# Patient Record
Sex: FEMALE | Race: ASIAN | Employment: UNEMPLOYED | URBAN - METROPOLITAN AREA
[De-identification: names, ages, dates, MRNs, and addresses within clinical notes are randomized per-mention and may not be internally consistent; named-entity substitution may affect disease eponyms.]

---

## 2017-05-17 PROCEDURE — 87624 HPV HI-RISK TYP POOLED RSLT: CPT | Performed by: OBSTETRICS & GYNECOLOGY

## 2017-05-17 PROCEDURE — 88175 CYTOPATH C/V AUTO FLUID REDO: CPT | Performed by: OBSTETRICS & GYNECOLOGY

## 2017-05-18 RX ORDER — MULTIVITAMIN
1 TABLET ORAL DAILY
COMMUNITY

## 2017-05-24 ENCOUNTER — HOSPITAL ENCOUNTER (OUTPATIENT)
Facility: HOSPITAL | Age: 62
Setting detail: HOSPITAL OUTPATIENT SURGERY
Discharge: HOME | End: 2017-05-24
Attending: OBSTETRICS & GYNECOLOGY | Admitting: OBSTETRICS & GYNECOLOGY
Payer: MEDICAID

## 2017-05-24 ENCOUNTER — ANESTHESIA EVENT (OUTPATIENT)
Dept: SURGERY | Facility: HOSPITAL | Age: 62
End: 2017-05-24
Payer: MEDICAID

## 2017-05-24 ENCOUNTER — SURGERY (OUTPATIENT)
Age: 62
End: 2017-05-24

## 2017-05-24 ENCOUNTER — ANESTHESIA (OUTPATIENT)
Dept: SURGERY | Facility: HOSPITAL | Age: 62
End: 2017-05-24
Payer: MEDICAID

## 2017-05-24 VITALS
OXYGEN SATURATION: 100 % | HEART RATE: 75 BPM | SYSTOLIC BLOOD PRESSURE: 156 MMHG | RESPIRATION RATE: 16 BRPM | WEIGHT: 138.44 LBS | HEIGHT: 62 IN | DIASTOLIC BLOOD PRESSURE: 89 MMHG | TEMPERATURE: 98 F | BODY MASS INDEX: 25.48 KG/M2

## 2017-05-24 PROCEDURE — 82962 GLUCOSE BLOOD TEST: CPT

## 2017-05-24 PROCEDURE — 88305 TISSUE EXAM BY PATHOLOGIST: CPT | Performed by: OBSTETRICS & GYNECOLOGY

## 2017-05-24 PROCEDURE — 0UDB8ZZ EXTRACTION OF ENDOMETRIUM, VIA NATURAL OR ARTIFICIAL OPENING ENDOSCOPIC: ICD-10-PCS | Performed by: OBSTETRICS & GYNECOLOGY

## 2017-05-24 RX ORDER — SODIUM CHLORIDE, SODIUM LACTATE, POTASSIUM CHLORIDE, CALCIUM CHLORIDE 600; 310; 30; 20 MG/100ML; MG/100ML; MG/100ML; MG/100ML
INJECTION, SOLUTION INTRAVENOUS CONTINUOUS
Status: DISCONTINUED | OUTPATIENT
Start: 2017-05-24 | End: 2017-05-24

## 2017-05-24 RX ORDER — ONDANSETRON 2 MG/ML
4 INJECTION INTRAMUSCULAR; INTRAVENOUS AS NEEDED
Status: DISCONTINUED | OUTPATIENT
Start: 2017-05-24 | End: 2017-05-24

## 2017-05-24 RX ORDER — LIDOCAINE HYDROCHLORIDE 10 MG/ML
INJECTION, SOLUTION INFILTRATION; PERINEURAL AS NEEDED
Status: DISCONTINUED | OUTPATIENT
Start: 2017-05-24 | End: 2017-05-24 | Stop reason: HOSPADM

## 2017-05-24 RX ORDER — ACETAMINOPHEN AND CODEINE PHOSPHATE 300; 30 MG/1; MG/1
1 TABLET ORAL AS NEEDED
Status: DISCONTINUED | OUTPATIENT
Start: 2017-05-24 | End: 2017-05-24

## 2017-05-24 RX ORDER — ACETAMINOPHEN AND CODEINE PHOSPHATE 300; 30 MG/1; MG/1
2 TABLET ORAL AS NEEDED
Status: DISCONTINUED | OUTPATIENT
Start: 2017-05-24 | End: 2017-05-24

## 2017-05-24 RX ORDER — HYDROMORPHONE HYDROCHLORIDE 1 MG/ML
0.4 INJECTION, SOLUTION INTRAMUSCULAR; INTRAVENOUS; SUBCUTANEOUS EVERY 5 MIN PRN
Status: DISCONTINUED | OUTPATIENT
Start: 2017-05-24 | End: 2017-05-24

## 2017-05-24 RX ORDER — ACETAMINOPHEN AND CODEINE PHOSPHATE 300; 30 MG/1; MG/1
TABLET ORAL
Status: COMPLETED
Start: 2017-05-24 | End: 2017-05-24

## 2017-05-24 RX ORDER — NALOXONE HYDROCHLORIDE 0.4 MG/ML
80 INJECTION, SOLUTION INTRAMUSCULAR; INTRAVENOUS; SUBCUTANEOUS AS NEEDED
Status: DISCONTINUED | OUTPATIENT
Start: 2017-05-24 | End: 2017-05-24

## 2017-05-24 NOTE — OR NURSING
Patient and daughter provided with discharge instructions, both verbalized understanding. Questions answered as they arise. Peripad checked and no drainage noted at this time.

## 2017-05-24 NOTE — H&P
Gyn Pre-op H&P    HPI: PMB    CC: Patient is a 57year old  here for problem visit.   She reports menopause x 10 years.  Prior to menopause in approximately , she reports having balloon ablation procedure for heavy menstrual bleeding.  Did not h atherosclerosis    • Prediabetes            Past Surgical History    ANGIOGRAM      ANGIOPLASTY (CORONARY)  2006    ENDOMETRIAL ABLATION  2005    Comment balloon ablation    CATH DRUG ELUTING STENT  2006    Comment Cardiac stent x1    CATH DRUG ELUTING MICHAEL edema      A/P: 56 yo with PMB, thin endometrium. Will proceed with hysteroscopy, D&C. Reviewed r/b/a, including but not limited to risk of bleeding, infection, injury to nearby organs, uterine perforation, need for additional unanticipated surgery.   [facility]

## 2017-05-24 NOTE — OPERATIVE REPORT
Operative Report      Pre-Operative Diagnosis: POSTMENOPAUSAL BLEEDING     Post-Operative Diagnosis: POSTMENOPAUSAL BLEEDING     Procedure Performed:   Procedure(s):   HYSTEROSCOPY, DILATION AND CURETTAGE    Surgeon(s) and Role:     * Rossi Moore MD - Pr

## 2017-05-24 NOTE — ANESTHESIA POSTPROCEDURE EVALUATION
[facility] Patient Status:  Hospital Outpatient Surgery   Age/Gender 57year old female MRN [id number]   Location [facility] Attending Casimiro Claudio MD   Hosp Day # 0 PCP Maureen Blake MD

## 2017-05-24 NOTE — ANESTHESIA PREPROCEDURE EVALUATION
PRE-OP EVALUATION    Patient Name: Iesha Quispe    Pre-op Diagnosis: POSTMENOPAUSAL BLEEDING    Procedure(s): HYSTEROSCOPY, DILATION AND CURETTAGE    Surgeon(s) and Role:     * Mitch Staples MD - Primary    Pre-op vitals reviewed.   Temp: 98.3 °F Cardiac stent x1    CATH DRUG ELUTING STENT  2015    Comment Cardiac stent done in 05/2015 and 06/2015        Smoking status: Never Smoker     Smokeless tobacco: Never Used    Alcohol Use: No       Drug Use: No     Available pre-op labs reviewed.

## 2017-10-31 ENCOUNTER — HOSPITAL ENCOUNTER (OUTPATIENT)
Dept: CV DIAGNOSTICS | Facility: HOSPITAL | Age: 62
Discharge: HOME | End: 2017-10-31
Attending: SPECIALIST
Payer: MEDICAID

## 2017-10-31 DIAGNOSIS — I10 HTN (HYPERTENSION): ICD-10-CM

## 2017-10-31 DIAGNOSIS — R06.02 SOB (SHORTNESS OF BREATH): ICD-10-CM

## 2017-10-31 DIAGNOSIS — I25.10 CAD (CORONARY ARTERY DISEASE): ICD-10-CM

## 2017-10-31 PROCEDURE — 93306 TTE W/DOPPLER COMPLETE: CPT | Performed by: SPECIALIST

## 2018-10-10 ENCOUNTER — HOSPITAL (OUTPATIENT)
Dept: OTHER | Age: 63
End: 2018-10-10
Attending: INTERNAL MEDICINE

## 2018-10-10 ENCOUNTER — DIAGNOSTIC TRANS (OUTPATIENT)
Dept: OTHER | Age: 63
End: 2018-10-10

## 2018-10-11 LAB
BUN SERPL-MCNC: 13 MG/DL (ref 6–20)
BUN/CREAT SERPL: 26 (ref 7–25)
CREAT SERPL-MCNC: 0.5 MG/DL (ref 0.51–0.95)

## 2019-01-07 ENCOUNTER — HOSPITAL (OUTPATIENT)
Dept: OTHER | Age: 64
End: 2019-01-07
Attending: INTERNAL MEDICINE

## 2019-01-11 ENCOUNTER — HOSPITAL (OUTPATIENT)
Dept: OTHER | Age: 64
End: 2019-01-11
Attending: INTERNAL MEDICINE

## 2019-03-25 ENCOUNTER — HOSPITAL (OUTPATIENT)
Dept: OTHER | Age: 64
End: 2019-03-25
Attending: INTERNAL MEDICINE

## 2019-05-10 ENCOUNTER — HOSPITAL ENCOUNTER (OUTPATIENT)
Dept: MAMMOGRAPHY | Age: 64
Discharge: HOME | End: 2019-05-10
Attending: DENTIST
Payer: MEDICARE

## 2019-05-10 DIAGNOSIS — Z12.31 ENCOUNTER FOR SCREENING MAMMOGRAM FOR MALIGNANT NEOPLASM OF BREAST: ICD-10-CM

## 2019-05-10 PROCEDURE — 77067 SCR MAMMO BI INCL CAD: CPT | Performed by: DENTIST

## 2019-05-10 PROCEDURE — 77063 BREAST TOMOSYNTHESIS BI: CPT | Performed by: DENTIST

## 2019-07-30 ENCOUNTER — HOSPITAL ENCOUNTER (OUTPATIENT)
Dept: MAMMOGRAPHY | Facility: HOSPITAL | Age: 64
Discharge: HOME | End: 2019-07-30
Attending: PHYSICIAN ASSISTANT
Payer: MEDICARE

## 2019-07-30 DIAGNOSIS — R92.2 BREAST DENSITY: ICD-10-CM

## 2019-07-30 DIAGNOSIS — N64.4 MASTODYNIA: ICD-10-CM

## 2019-07-30 PROCEDURE — 76641 ULTRASOUND BREAST COMPLETE: CPT | Performed by: PHYSICIAN ASSISTANT

## 2019-07-30 PROCEDURE — 77062 BREAST TOMOSYNTHESIS BI: CPT | Performed by: PHYSICIAN ASSISTANT

## 2019-07-30 PROCEDURE — 77066 DX MAMMO INCL CAD BI: CPT | Performed by: PHYSICIAN ASSISTANT

## 2019-12-30 ENCOUNTER — HOSPITAL ENCOUNTER (OUTPATIENT)
Dept: GENERAL RADIOLOGY | Age: 64
Discharge: HOME | End: 2019-12-30
Attending: PHYSICIAN ASSISTANT
Payer: COMMERCIAL

## 2019-12-30 DIAGNOSIS — M54.9 BACK ACHE: ICD-10-CM

## 2019-12-30 DIAGNOSIS — S16.1XXA CERVICAL STRAIN: ICD-10-CM

## 2019-12-30 DIAGNOSIS — S39.012A LUMBAR STRAIN: ICD-10-CM
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
## 2019-12-30 PROCEDURE — 72110 X-RAY EXAM L-2 SPINE 4/>VWS: CPT | Performed by: PHYSICIAN ASSISTANT

## 2019-12-30 PROCEDURE — 72050 X-RAY EXAM NECK SPINE 4/5VWS: CPT | Performed by: PHYSICIAN ASSISTANT

## 2020-08-25 ENCOUNTER — EXTERNAL RECORD (OUTPATIENT)
Dept: HEALTH INFORMATION MANAGEMENT | Facility: OTHER | Age: 65
End: 2020-08-25

## 2020-08-27 ENCOUNTER — EXTERNAL RECORD (OUTPATIENT)
Dept: HEALTH INFORMATION MANAGEMENT | Facility: OTHER | Age: 65
End: 2020-08-27

## 2020-09-02 ENCOUNTER — EXTERNAL RECORD (OUTPATIENT)
Dept: HEALTH INFORMATION MANAGEMENT | Facility: OTHER | Age: 65
End: 2020-09-02

## 2020-10-22 ENCOUNTER — EXTERNAL RECORD (OUTPATIENT)
Dept: HEALTH INFORMATION MANAGEMENT | Facility: OTHER | Age: 65
End: 2020-10-22

## 2021-02-25 ENCOUNTER — EXTERNAL RECORD (OUTPATIENT)
Dept: HEALTH INFORMATION MANAGEMENT | Facility: OTHER | Age: 66
End: 2021-02-25

## 2021-08-26 ENCOUNTER — EXTERNAL RECORD (OUTPATIENT)
Dept: HEALTH INFORMATION MANAGEMENT | Facility: OTHER | Age: 66
End: 2021-08-26

## 2022-01-01 ENCOUNTER — EXTERNAL RECORD (OUTPATIENT)
Dept: OTHER | Age: 67
End: 2022-01-01

## 2022-05-31 ENCOUNTER — CLINICAL ABSTRACT (OUTPATIENT)
Dept: HEALTH INFORMATION MANAGEMENT | Facility: OTHER | Age: 67
End: 2022-05-31

## 2022-06-01 RX ORDER — ASPIRIN 81 MG/1
81 TABLET ORAL DAILY
COMMUNITY

## 2022-06-01 RX ORDER — METOPROLOL SUCCINATE 100 MG/1
100 TABLET, EXTENDED RELEASE ORAL DAILY
COMMUNITY
End: 2023-01-12 | Stop reason: SDUPTHER

## 2022-06-01 RX ORDER — POTASSIUM CHLORIDE 750 MG/1
20 TABLET, FILM COATED, EXTENDED RELEASE ORAL DAILY
COMMUNITY
End: 2023-01-12 | Stop reason: SDUPTHER

## 2022-06-01 RX ORDER — AMLODIPINE BESYLATE 5 MG/1
5 TABLET ORAL DAILY
COMMUNITY
End: 2023-01-12 | Stop reason: SDUPTHER

## 2022-06-01 RX ORDER — LOSARTAN POTASSIUM AND HYDROCHLOROTHIAZIDE 25; 100 MG/1; MG/1
1 TABLET ORAL DAILY
COMMUNITY
End: 2023-01-12 | Stop reason: ALTCHOICE

## 2022-06-01 RX ORDER — ATORVASTATIN CALCIUM 20 MG/1
20 TABLET, FILM COATED ORAL DAILY
COMMUNITY
End: 2023-01-12 | Stop reason: SDUPTHER

## 2022-06-07 PROBLEM — E11.9 DIABETES MELLITUS TYPE 2, NONINSULIN DEPENDENT  (CMD): Status: ACTIVE | Noted: 2022-06-07

## 2022-06-07 PROBLEM — G47.30 SLEEP APNEA: Status: ACTIVE | Noted: 2022-06-07

## 2022-06-07 PROBLEM — I10 HYPERTENSION: Status: ACTIVE | Noted: 2022-06-07

## 2022-06-07 PROBLEM — I25.10 CAD (CORONARY ARTERY DISEASE): Status: ACTIVE | Noted: 2022-06-07

## 2022-06-07 PROBLEM — E78.5 HYPERLIPIDEMIA: Status: ACTIVE | Noted: 2022-06-07

## 2022-06-15 ENCOUNTER — OFFICE VISIT (OUTPATIENT)
Dept: CARDIOLOGY | Age: 67
End: 2022-06-15

## 2022-06-15 VITALS
HEIGHT: 62 IN | BODY MASS INDEX: 25.21 KG/M2 | WEIGHT: 137 LBS | DIASTOLIC BLOOD PRESSURE: 58 MMHG | SYSTOLIC BLOOD PRESSURE: 108 MMHG | HEART RATE: 71 BPM

## 2022-06-15 DIAGNOSIS — Z95.5 S/P DRUG ELUTING CORONARY STENT PLACEMENT: ICD-10-CM

## 2022-06-15 DIAGNOSIS — R07.89 ATYPICAL CHEST PAIN: ICD-10-CM

## 2022-06-15 DIAGNOSIS — E78.2 MIXED HYPERLIPIDEMIA: ICD-10-CM

## 2022-06-15 DIAGNOSIS — E11.9 DIABETES MELLITUS TYPE 2, NONINSULIN DEPENDENT (CMD): ICD-10-CM

## 2022-06-15 DIAGNOSIS — I10 PRIMARY HYPERTENSION: ICD-10-CM

## 2022-06-15 DIAGNOSIS — I25.10 CORONARY ARTERY DISEASE INVOLVING NATIVE CORONARY ARTERY OF NATIVE HEART WITHOUT ANGINA PECTORIS: Primary | ICD-10-CM

## 2022-06-15 PROCEDURE — 99214 OFFICE O/P EST MOD 30 MIN: CPT | Performed by: SPECIALIST

## 2022-06-15 PROCEDURE — 3078F DIAST BP <80 MM HG: CPT | Performed by: SPECIALIST

## 2022-06-15 PROCEDURE — 93000 ELECTROCARDIOGRAM COMPLETE: CPT | Performed by: SPECIALIST

## 2022-06-15 PROCEDURE — 3074F SYST BP LT 130 MM HG: CPT | Performed by: SPECIALIST

## 2022-06-15 SDOH — HEALTH STABILITY: PHYSICAL HEALTH: ON AVERAGE, HOW MANY MINUTES DO YOU ENGAGE IN EXERCISE AT THIS LEVEL?: 50 MIN

## 2022-06-15 SDOH — HEALTH STABILITY: PHYSICAL HEALTH: ON AVERAGE, HOW MANY DAYS PER WEEK DO YOU ENGAGE IN MODERATE TO STRENUOUS EXERCISE (LIKE A BRISK WALK)?: 1 DAY

## 2022-06-15 ASSESSMENT — PATIENT HEALTH QUESTIONNAIRE - PHQ9
1. LITTLE INTEREST OR PLEASURE IN DOING THINGS: NOT AT ALL
SUM OF ALL RESPONSES TO PHQ9 QUESTIONS 1 AND 2: 1
2. FEELING DOWN, DEPRESSED OR HOPELESS: SEVERAL DAYS
SUM OF ALL RESPONSES TO PHQ9 QUESTIONS 1 AND 2: 1
CLINICAL INTERPRETATION OF PHQ2 SCORE: NO FURTHER SCREENING NEEDED

## 2022-06-23 ENCOUNTER — TELEPHONE (OUTPATIENT)
Dept: SLEEP MEDICINE | Age: 67
End: 2022-06-23

## 2022-06-23 DIAGNOSIS — G47.33 OBSTRUCTIVE SLEEP APNEA (ADULT) (PEDIATRIC): Primary | ICD-10-CM

## 2022-07-11 ENCOUNTER — OFFICE VISIT (OUTPATIENT)
Dept: SLEEP MEDICINE | Age: 67
End: 2022-07-11
Attending: NURSE PRACTITIONER

## 2022-07-11 DIAGNOSIS — G47.33 OBSTRUCTIVE SLEEP APNEA (ADULT) (PEDIATRIC): ICD-10-CM

## 2022-07-11 LAB — REPORT TEXT: NORMAL

## 2022-07-18 PROCEDURE — 95810 POLYSOM 6/> YRS 4/> PARAM: CPT | Performed by: PEDIATRICS

## 2022-07-28 ENCOUNTER — OFFICE VISIT (OUTPATIENT)
Dept: SLEEP MEDICINE | Age: 67
End: 2022-07-28

## 2022-07-28 VITALS
DIASTOLIC BLOOD PRESSURE: 52 MMHG | BODY MASS INDEX: 26.06 KG/M2 | OXYGEN SATURATION: 99 % | HEIGHT: 61 IN | WEIGHT: 138 LBS | SYSTOLIC BLOOD PRESSURE: 102 MMHG | HEART RATE: 75 BPM

## 2022-07-28 DIAGNOSIS — G47.33 OBSTRUCTIVE SLEEP APNEA (ADULT) (PEDIATRIC): ICD-10-CM

## 2022-07-28 DIAGNOSIS — R06.83 SNORING: Primary | ICD-10-CM

## 2022-07-28 DIAGNOSIS — G47.20 DYSFUNCTIONS OF SLEEP STAGES OR AROUSAL FROM SLEEP: ICD-10-CM

## 2022-07-28 DIAGNOSIS — R51.9 NONINTRACTABLE HEADACHE, UNSPECIFIED CHRONICITY PATTERN, UNSPECIFIED HEADACHE TYPE: ICD-10-CM

## 2022-07-28 DIAGNOSIS — I10 PRIMARY HYPERTENSION: ICD-10-CM

## 2022-07-28 DIAGNOSIS — E11.9 DIABETES MELLITUS TYPE 2, NONINSULIN DEPENDENT (CMD): ICD-10-CM

## 2022-07-28 PROCEDURE — 99202 OFFICE O/P NEW SF 15 MIN: CPT | Performed by: NURSE PRACTITIONER

## 2022-07-28 PROCEDURE — 3078F DIAST BP <80 MM HG: CPT | Performed by: NURSE PRACTITIONER

## 2022-07-28 PROCEDURE — 99204 OFFICE O/P NEW MOD 45 MIN: CPT | Performed by: NURSE PRACTITIONER

## 2022-07-28 PROCEDURE — 3074F SYST BP LT 130 MM HG: CPT | Performed by: NURSE PRACTITIONER

## 2022-07-28 ASSESSMENT — ENCOUNTER SYMPTOMS
VOMITING: 0
PHOTOPHOBIA: 0
NUMBNESS: 0
EXCESSIVE DAYTIME SLEEPINESS: 1
SORE THROAT: 0
CHILLS: 0
PARESTHESIAS: 0
NAUSEA: 0
SLEEP DISTURBANCES DUE TO BREATHING: 0
CONSTIPATION: 0
WEIGHT LOSS: 0
BLURRED VISION: 0
SNORING: 1
HEADACHES: 1
DIARRHEA: 0
FEVER: 0
BACK PAIN: 0

## 2022-08-16 ENCOUNTER — HOSPITAL ENCOUNTER (OUTPATIENT)
Dept: CARDIOLOGY | Age: 67
Discharge: HOME | End: 2022-08-16
Attending: SPECIALIST

## 2022-08-16 DIAGNOSIS — I25.10 CORONARY ARTERY DISEASE INVOLVING NATIVE CORONARY ARTERY OF NATIVE HEART WITHOUT ANGINA PECTORIS: ICD-10-CM

## 2022-08-16 DIAGNOSIS — R07.89 ATYPICAL CHEST PAIN: ICD-10-CM

## 2022-08-16 DIAGNOSIS — I10 PRIMARY HYPERTENSION: ICD-10-CM

## 2022-08-16 DIAGNOSIS — Z95.5 S/P DRUG ELUTING CORONARY STENT PLACEMENT: ICD-10-CM

## 2022-08-16 LAB
AORTIC VALVE AREA: 2.2
ASCENDING AORTA (AAD): 3
AV MEAN GRADIENT (AVMG): 4
AV MEAN VELOCITY (AVMV): NORMAL
AV PEAK GRADIENT (AVPG): 8
AV PEAK VELOCITY (AVPV): NORMAL
AV STENOSIS SEVERITY TEXT: NORMAL
DOP CALC LVOT PEAK VEL (LVOTPV): 0.93
E WAVE DECELARATION TIME (MDT): 248
LEFT INTERNAL DIMENSION IN SYSTOLE (LVSD): 2.36
LEFT VENTRICULAR INTERNAL DIMENSION IN DIASTOLE (LVDD): 3.73
LV EF: NORMAL %
LVOT 2D (LVOTD): 2
LVOT VTI (LVOTVTI): 19.9
MV E TISSUE VEL LAT (MELV): 7.29
MV E TISSUE VEL MED (MESV): 5.77
MV E WAVE VEL/E TISSUE VEL MED(MSR): 10.24
STRESS TARGET HR: 152 BPM
TRICUSPID ANNULAR PLANE SYSTOLIC EXCURSION (TAPSE): 2

## 2022-08-16 PROCEDURE — 78452 HT MUSCLE IMAGE SPECT MULT: CPT | Performed by: SPECIALIST

## 2022-08-16 PROCEDURE — G1004 CDSM NDSC: HCPCS

## 2022-08-16 PROCEDURE — 93017 CV STRESS TEST TRACING ONLY: CPT

## 2022-08-16 PROCEDURE — G1004 CDSM NDSC: HCPCS | Performed by: SPECIALIST

## 2022-08-16 PROCEDURE — 76376 3D RENDER W/INTRP POSTPROCES: CPT

## 2022-08-16 PROCEDURE — 76376 3D RENDER W/INTRP POSTPROCES: CPT | Performed by: SPECIALIST

## 2022-08-16 PROCEDURE — 93018 CV STRESS TEST I&R ONLY: CPT | Performed by: SPECIALIST

## 2022-08-16 PROCEDURE — A9500 TC99M SESTAMIBI: HCPCS | Performed by: SPECIALIST

## 2022-08-16 PROCEDURE — 93016 CV STRESS TEST SUPVJ ONLY: CPT | Performed by: SPECIALIST

## 2022-08-16 PROCEDURE — 10006150 HB RX 343: Performed by: SPECIALIST

## 2022-08-16 PROCEDURE — 78452 HT MUSCLE IMAGE SPECT MULT: CPT

## 2022-08-16 PROCEDURE — 93306 TTE W/DOPPLER COMPLETE: CPT

## 2022-08-16 PROCEDURE — 93306 TTE W/DOPPLER COMPLETE: CPT | Performed by: SPECIALIST

## 2022-08-16 RX ORDER — TETRAKIS(2-METHOXYISOBUTYLISOCYANIDE)COPPER(I) TETRAFLUOROBORATE 1 MG/ML
8 INJECTION, POWDER, LYOPHILIZED, FOR SOLUTION INTRAVENOUS ONCE
Status: COMPLETED | OUTPATIENT
Start: 2022-08-16 | End: 2022-08-16

## 2022-08-16 RX ORDER — TETRAKIS(2-METHOXYISOBUTYLISOCYANIDE)COPPER(I) TETRAFLUOROBORATE 1 MG/ML
24 INJECTION, POWDER, LYOPHILIZED, FOR SOLUTION INTRAVENOUS ONCE
Status: COMPLETED | OUTPATIENT
Start: 2022-08-16 | End: 2022-08-16

## 2022-08-16 RX ADMIN — TETRAKIS(2-METHOXYISOBUTYLISOCYANIDE)COPPER(I) TETRAFLUOROBORATE 10.4 MILLICURIE: 1 INJECTION, POWDER, LYOPHILIZED, FOR SOLUTION INTRAVENOUS at 10:10

## 2022-08-16 RX ADMIN — TETRAKIS(2-METHOXYISOBUTYLISOCYANIDE)COPPER(I) TETRAFLUOROBORATE 29 MILLICURIE: 1 INJECTION, POWDER, LYOPHILIZED, FOR SOLUTION INTRAVENOUS at 11:41

## 2022-08-17 ENCOUNTER — TELEPHONE (OUTPATIENT)
Dept: CARDIOLOGY | Age: 67
End: 2022-08-17

## 2022-08-31 ENCOUNTER — OFFICE VISIT (OUTPATIENT)
Dept: SLEEP MEDICINE | Age: 67
End: 2022-08-31
Attending: PEDIATRICS

## 2022-08-31 DIAGNOSIS — G47.33 OBSTRUCTIVE SLEEP APNEA (ADULT) (PEDIATRIC): Primary | ICD-10-CM

## 2022-08-31 DIAGNOSIS — R51.9 NONINTRACTABLE HEADACHE, UNSPECIFIED CHRONICITY PATTERN, UNSPECIFIED HEADACHE TYPE: ICD-10-CM

## 2022-08-31 DIAGNOSIS — I10 PRIMARY HYPERTENSION: ICD-10-CM

## 2022-08-31 DIAGNOSIS — R06.83 SNORING: ICD-10-CM

## 2022-08-31 DIAGNOSIS — E11.9 DIABETES MELLITUS TYPE 2, NONINSULIN DEPENDENT (CMD): ICD-10-CM

## 2022-08-31 DIAGNOSIS — G47.20 DYSFUNCTIONS OF SLEEP STAGES OR AROUSAL FROM SLEEP: ICD-10-CM

## 2022-08-31 PROCEDURE — 95806 SLEEP STUDY UNATT&RESP EFFT: CPT | Performed by: PEDIATRICS

## 2022-09-01 LAB — REPORT TEXT: NORMAL

## 2022-09-05 PROCEDURE — G0399 HOME SLEEP TEST/TYPE 3 PORTA: HCPCS | Performed by: PEDIATRICS

## 2022-09-08 ENCOUNTER — HOSPITAL ENCOUNTER (OUTPATIENT)
Dept: MAMMOGRAPHY | Age: 67
Discharge: HOME | End: 2022-09-08
Attending: INTERNAL MEDICINE
Payer: MEDICARE

## 2022-09-08 DIAGNOSIS — Z12.31 SCREENING MAMMOGRAM FOR BREAST CANCER: ICD-10-CM

## 2022-09-08 PROCEDURE — 77063 BREAST TOMOSYNTHESIS BI: CPT | Performed by: INTERNAL MEDICINE

## 2022-09-08 PROCEDURE — 77067 SCR MAMMO BI INCL CAD: CPT | Performed by: INTERNAL MEDICINE

## 2022-09-09 LAB
ALBUMIN SERPL-MCNC: 4.3 G/DL (ref 3.6–5.1)
ALBUMIN/GLOB SERPL: 1.6 (CALC) (ref 1–2.5)
ALP SERPL-CCNC: 68 U/L (ref 37–153)
ALT SERPL-CCNC: 19 U/L (ref 6–29)
APPEARANCE UR: ABNORMAL
AST SERPL-CCNC: 17 U/L (ref 10–35)
BACTERIA #/AREA URNS HPF: ABNORMAL /HPF
BASOPHILS # BLD: 50 CELLS/UL (ref 0–200)
BASOPHILS NFR BLD: 0.6 %
BILIRUB SERPL-MCNC: 0.5 MG/DL (ref 0.2–1.2)
BILIRUB UR QL: NEGATIVE
BUN SERPL-MCNC: 11 MG/DL (ref 7–25)
BUN/CREAT SERPL: ABNORMAL (CALC) (ref 6–22)
CALCIUM SERPL-MCNC: 9.6 MG/DL (ref 8.6–10.4)
CHLORIDE SERPL-SCNC: 101 MMOL/L (ref 98–110)
CHOLEST SERPL-MCNC: 121 MG/DL
CHOLEST/HDLC SERPL: 3.1 (CALC)
CO2 SERPL-SCNC: 27 MMOL/L (ref 20–32)
COLOR UR: ABNORMAL
CREAT SERPL-MCNC: 0.65 MG/DL (ref 0.5–1.05)
CREAT UR-MCNC: 184 MG/DL (ref 20–275)
EOSINOPHIL # BLD: 183 CELLS/UL (ref 15–500)
EOSINOPHIL NFR BLD: 2.2 %
ERYTHROCYTE [DISTWIDTH] IN BLOOD: 14.4 % (ref 11–15)
GFR SERPLBLD SCHWARTZ-ARVRAT: 96 ML/MIN/1.73M2
GLOBULIN SER-MCNC: 2.7 G/DL (CALC) (ref 1.9–3.7)
GLUCOSE SERPL-MCNC: 133 MG/DL (ref 65–99)
GLUCOSE UR-MCNC: NEGATIVE MG/DL
HCT VFR BLD CALC: 37.3 % (ref 35–45)
HDLC SERPL-MCNC: 39 MG/DL
HGB BLD-MCNC: 11.9 G/DL (ref 11.7–15.5)
HGB UR QL: NEGATIVE
HYALINE CASTS #/AREA URNS LPF: ABNORMAL /LPF
KETONES UR-MCNC: ABNORMAL MG/DL
LDLC SERPL CALC-MCNC: 62 MG/DL (CALC)
LENGTH OF FAST TIME PATIENT: ABNORMAL H
LENGTH OF FAST TIME PATIENT: ABNORMAL H
LEUKOCYTE ESTERASE UR QL STRIP: ABNORMAL
LYMPHOCYTES # BLD: 2722 CELLS/UL (ref 850–3900)
LYMPHOCYTES NFR BLD: 32.8 %
MCH RBC QN AUTO: 25.8 PG (ref 27–33)
MCHC RBC AUTO-ENTMCNC: 31.9 G/DL (ref 32–36)
MCV RBC AUTO: 80.9 FL (ref 80–100)
MICROALBUMIN UR-MCNC: 1.5 MG/DL
MICROALBUMIN/CREAT UR: 8 MCG/MG CREAT
MONOCYTES # BLD: 498 CELLS/UL (ref 200–950)
MONOCYTES NFR BLD: 6 %
MPV (OFFPRE2): 10 FL (ref 7.5–12.5)
NEUTROPHILS # BLD: 4847 CELLS/UL (ref 1500–7800)
NEUTROPHILS NFR BLD: 58.4 %
NITRITE UR QL: NEGATIVE
NONHDLC SERPL-MCNC: 82 MG/DL (CALC)
PH UR: 5.5 [PH] (ref 5–8)
PLATELET # BLD: 275 THOUSAND/UL (ref 140–400)
POTASSIUM SERPL-SCNC: 3.4 MMOL/L (ref 3.5–5.3)
PROT SERPL-MCNC: 7 G/DL (ref 6.1–8.1)
PROT UR QL: ABNORMAL
RBC # BLD: 4.61 MILLION/UL (ref 3.8–5.1)
RBC #/AREA URNS HPF: ABNORMAL /HPF
SODIUM SERPL-SCNC: 139 MMOL/L (ref 135–146)
SP GR UR: 1.02 (ref 1–1.03)
SQUAMOUS #/AREA URNS HPF: ABNORMAL /HPF
TRIGL SERPL-MCNC: 117 MG/DL
TSH SERPL-ACNC: 3.09 MIU/L (ref 0.4–4.5)
WBC # BLD: 8.3 THOUSAND/UL (ref 3.8–10.8)
WBC #/AREA URNS HPF: ABNORMAL /HPF

## 2022-09-13 ENCOUNTER — TELEPHONE (OUTPATIENT)
Dept: CARDIOLOGY | Age: 67
End: 2022-09-13

## 2022-09-14 ENCOUNTER — OFFICE VISIT (OUTPATIENT)
Dept: SLEEP MEDICINE | Age: 67
End: 2022-09-14

## 2022-09-14 VITALS
SYSTOLIC BLOOD PRESSURE: 111 MMHG | HEIGHT: 61 IN | DIASTOLIC BLOOD PRESSURE: 72 MMHG | BODY MASS INDEX: 26.06 KG/M2 | OXYGEN SATURATION: 98 % | WEIGHT: 138 LBS | HEART RATE: 90 BPM

## 2022-09-14 DIAGNOSIS — G47.33 OBSTRUCTIVE SLEEP APNEA (ADULT) (PEDIATRIC): Primary | ICD-10-CM

## 2022-09-14 DIAGNOSIS — R06.83 SNORING: ICD-10-CM

## 2022-09-14 DIAGNOSIS — E11.9 DIABETES MELLITUS TYPE 2, NONINSULIN DEPENDENT (CMD): ICD-10-CM

## 2022-09-14 DIAGNOSIS — G47.20 DYSFUNCTIONS OF SLEEP STAGES OR AROUSAL FROM SLEEP: ICD-10-CM

## 2022-09-14 DIAGNOSIS — R51.9 NONINTRACTABLE HEADACHE, UNSPECIFIED CHRONICITY PATTERN, UNSPECIFIED HEADACHE TYPE: ICD-10-CM

## 2022-09-14 DIAGNOSIS — I10 PRIMARY HYPERTENSION: ICD-10-CM

## 2022-09-14 PROCEDURE — 3074F SYST BP LT 130 MM HG: CPT | Performed by: NURSE PRACTITIONER

## 2022-09-14 PROCEDURE — 99214 OFFICE O/P EST MOD 30 MIN: CPT | Performed by: NURSE PRACTITIONER

## 2022-09-14 PROCEDURE — 3078F DIAST BP <80 MM HG: CPT | Performed by: NURSE PRACTITIONER

## 2022-09-14 PROCEDURE — 99211 OFF/OP EST MAY X REQ PHY/QHP: CPT | Performed by: NURSE PRACTITIONER

## 2022-09-14 ASSESSMENT — ENCOUNTER SYMPTOMS
SORE THROAT: 0
NAUSEA: 0
CONSTIPATION: 0
WEIGHT LOSS: 0
SLEEP DISTURBANCES DUE TO BREATHING: 0
EXCESSIVE DAYTIME SLEEPINESS: 1
CHILLS: 0
DIARRHEA: 0
BACK PAIN: 0
SNORING: 1
BLURRED VISION: 0
FEVER: 0
NUMBNESS: 0
PARESTHESIAS: 0
PHOTOPHOBIA: 0
HEADACHES: 0
VOMITING: 0

## 2022-11-10 ENCOUNTER — TELEPHONE (OUTPATIENT)
Dept: CARDIOLOGY | Age: 67
End: 2022-11-10

## 2022-11-14 ENCOUNTER — TELEPHONE (OUTPATIENT)
Dept: CARDIOLOGY | Age: 67
End: 2022-11-14

## 2022-11-14 ENCOUNTER — OFFICE VISIT (OUTPATIENT)
Dept: CARDIOLOGY | Age: 67
End: 2022-11-14

## 2022-11-14 VITALS
BODY MASS INDEX: 22.35 KG/M2 | HEART RATE: 72 BPM | WEIGHT: 134.15 LBS | DIASTOLIC BLOOD PRESSURE: 72 MMHG | SYSTOLIC BLOOD PRESSURE: 128 MMHG | HEIGHT: 65 IN

## 2022-11-14 DIAGNOSIS — E11.9 DIABETES MELLITUS TYPE 2, NONINSULIN DEPENDENT (CMD): ICD-10-CM

## 2022-11-14 DIAGNOSIS — I10 PRIMARY HYPERTENSION: ICD-10-CM

## 2022-11-14 DIAGNOSIS — E78.2 MIXED HYPERLIPIDEMIA: ICD-10-CM

## 2022-11-14 DIAGNOSIS — G47.39 OTHER SLEEP APNEA: ICD-10-CM

## 2022-11-14 DIAGNOSIS — I25.10 CORONARY ARTERY DISEASE INVOLVING NATIVE CORONARY ARTERY OF NATIVE HEART WITHOUT ANGINA PECTORIS: Primary | ICD-10-CM

## 2022-11-14 PROCEDURE — 93000 ELECTROCARDIOGRAM COMPLETE: CPT | Performed by: NURSE PRACTITIONER

## 2022-11-14 PROCEDURE — 3074F SYST BP LT 130 MM HG: CPT | Performed by: NURSE PRACTITIONER

## 2022-11-14 PROCEDURE — 3078F DIAST BP <80 MM HG: CPT | Performed by: NURSE PRACTITIONER

## 2022-11-14 PROCEDURE — 99214 OFFICE O/P EST MOD 30 MIN: CPT | Performed by: NURSE PRACTITIONER

## 2022-11-14 SDOH — HEALTH STABILITY: PHYSICAL HEALTH: ON AVERAGE, HOW MANY MINUTES DO YOU ENGAGE IN EXERCISE AT THIS LEVEL?: 0 MIN

## 2022-11-14 SDOH — HEALTH STABILITY: PHYSICAL HEALTH: ON AVERAGE, HOW MANY DAYS PER WEEK DO YOU ENGAGE IN MODERATE TO STRENUOUS EXERCISE (LIKE A BRISK WALK)?: 0 DAYS

## 2022-11-14 ASSESSMENT — PATIENT HEALTH QUESTIONNAIRE - PHQ9
CLINICAL INTERPRETATION OF PHQ2 SCORE: NO FURTHER SCREENING NEEDED
SUM OF ALL RESPONSES TO PHQ9 QUESTIONS 1 AND 2: 0
SUM OF ALL RESPONSES TO PHQ9 QUESTIONS 1 AND 2: 0
2. FEELING DOWN, DEPRESSED OR HOPELESS: NOT AT ALL
1. LITTLE INTEREST OR PLEASURE IN DOING THINGS: NOT AT ALL

## 2022-12-20 ENCOUNTER — TELEPHONE (OUTPATIENT)
Dept: SLEEP MEDICINE | Age: 67
End: 2022-12-20

## 2022-12-21 ENCOUNTER — APPOINTMENT (OUTPATIENT)
Dept: CARDIOLOGY | Age: 67
End: 2022-12-21

## 2023-01-12 ENCOUNTER — TELEPHONE (OUTPATIENT)
Dept: CARDIOLOGY | Age: 68
End: 2023-01-12

## 2023-01-12 ENCOUNTER — OFFICE VISIT (OUTPATIENT)
Dept: CARDIOLOGY | Age: 68
End: 2023-01-12

## 2023-01-12 ENCOUNTER — OFFICE VISIT (OUTPATIENT)
Dept: SLEEP MEDICINE | Age: 68
End: 2023-01-12

## 2023-01-12 VITALS
HEART RATE: 96 BPM | BODY MASS INDEX: 24.75 KG/M2 | HEIGHT: 62 IN | WEIGHT: 134.48 LBS | SYSTOLIC BLOOD PRESSURE: 150 MMHG | DIASTOLIC BLOOD PRESSURE: 80 MMHG

## 2023-01-12 VITALS
DIASTOLIC BLOOD PRESSURE: 60 MMHG | SYSTOLIC BLOOD PRESSURE: 118 MMHG | WEIGHT: 138 LBS | HEIGHT: 62 IN | TEMPERATURE: 97.9 F | BODY MASS INDEX: 25.4 KG/M2 | HEART RATE: 81 BPM | OXYGEN SATURATION: 98 %

## 2023-01-12 DIAGNOSIS — I10 PRIMARY HYPERTENSION: ICD-10-CM

## 2023-01-12 DIAGNOSIS — G47.33 OSA (OBSTRUCTIVE SLEEP APNEA): Primary | ICD-10-CM

## 2023-01-12 DIAGNOSIS — Z95.5 PRESENCE OF DRUG-ELUTING STENT IN RIGHT CORONARY ARTERY: ICD-10-CM

## 2023-01-12 DIAGNOSIS — E11.9 DIABETES MELLITUS TYPE 2, NONINSULIN DEPENDENT (CMD): ICD-10-CM

## 2023-01-12 DIAGNOSIS — Z95.5 PRESENCE OF DRUG-ELUTING STENT IN ANTERIOR DESCENDING BRANCH OF LEFT CORONARY ARTERY: ICD-10-CM

## 2023-01-12 DIAGNOSIS — I25.10 CORONARY ARTERY DISEASE INVOLVING NATIVE CORONARY ARTERY OF NATIVE HEART WITHOUT ANGINA PECTORIS: Primary | ICD-10-CM

## 2023-01-12 DIAGNOSIS — E78.2 MIXED HYPERLIPIDEMIA: ICD-10-CM

## 2023-01-12 PROCEDURE — 99211 OFF/OP EST MAY X REQ PHY/QHP: CPT | Performed by: NURSE PRACTITIONER

## 2023-01-12 PROCEDURE — 3077F SYST BP >= 140 MM HG: CPT | Performed by: SPECIALIST

## 2023-01-12 PROCEDURE — 99214 OFFICE O/P EST MOD 30 MIN: CPT | Performed by: NURSE PRACTITIONER

## 2023-01-12 PROCEDURE — 3079F DIAST BP 80-89 MM HG: CPT | Performed by: SPECIALIST

## 2023-01-12 PROCEDURE — 3078F DIAST BP <80 MM HG: CPT | Performed by: NURSE PRACTITIONER

## 2023-01-12 PROCEDURE — 3074F SYST BP LT 130 MM HG: CPT | Performed by: NURSE PRACTITIONER

## 2023-01-12 PROCEDURE — 99214 OFFICE O/P EST MOD 30 MIN: CPT | Performed by: SPECIALIST

## 2023-01-12 RX ORDER — OLMESARTAN MEDOXOMIL AND HYDROCHLOROTHIAZIDE 40/25 40; 25 MG/1; MG/1
1 TABLET ORAL DAILY
Qty: 90 TABLET | Refills: 1 | Status: SHIPPED | OUTPATIENT
Start: 2023-01-12 | End: 2023-08-03 | Stop reason: ALTCHOICE

## 2023-01-12 RX ORDER — POTASSIUM CHLORIDE 1500 MG/1
20 TABLET, EXTENDED RELEASE ORAL DAILY
Qty: 90 TABLET | Refills: 1 | Status: SHIPPED | OUTPATIENT
Start: 2023-01-12 | End: 2023-10-10 | Stop reason: SDUPTHER

## 2023-01-12 RX ORDER — ATORVASTATIN CALCIUM 20 MG/1
20 TABLET, FILM COATED ORAL DAILY
Qty: 90 TABLET | Refills: 1 | Status: SHIPPED | OUTPATIENT
Start: 2023-01-12 | End: 2023-10-12 | Stop reason: SDUPTHER

## 2023-01-12 RX ORDER — AMLODIPINE BESYLATE 5 MG/1
5 TABLET ORAL DAILY
Qty: 90 TABLET | Refills: 1 | Status: SHIPPED | OUTPATIENT
Start: 2023-01-12 | End: 2023-06-20 | Stop reason: SDUPTHER

## 2023-01-12 RX ORDER — METOPROLOL SUCCINATE 100 MG/1
100 TABLET, EXTENDED RELEASE ORAL DAILY
Qty: 90 TABLET | Refills: 1 | Status: SHIPPED | OUTPATIENT
Start: 2023-01-12 | End: 2023-08-03 | Stop reason: SDUPTHER

## 2023-01-12 RX ORDER — OLMESARTAN MEDOXOMIL AND HYDROCHLOROTHIAZIDE 40/25 40; 25 MG/1; MG/1
1 TABLET ORAL DAILY
COMMUNITY
End: 2023-01-12 | Stop reason: SDUPTHER

## 2023-01-12 SDOH — HEALTH STABILITY: PHYSICAL HEALTH: ON AVERAGE, HOW MANY DAYS PER WEEK DO YOU ENGAGE IN MODERATE TO STRENUOUS EXERCISE (LIKE A BRISK WALK)?: 4 DAYS

## 2023-01-12 SDOH — HEALTH STABILITY: PHYSICAL HEALTH: ON AVERAGE, HOW MANY MINUTES DO YOU ENGAGE IN EXERCISE AT THIS LEVEL?: 10 MIN

## 2023-01-12 ASSESSMENT — ENCOUNTER SYMPTOMS
EYES NEGATIVE: 1
ALLERGIC/IMMUNOLOGIC NEGATIVE: 1
PSYCHIATRIC NEGATIVE: 1
CONSTITUTIONAL NEGATIVE: 1
ENDOCRINE NEGATIVE: 1
GASTROINTESTINAL NEGATIVE: 1
RESPIRATORY NEGATIVE: 1
NEUROLOGICAL NEGATIVE: 1

## 2023-01-12 ASSESSMENT — PATIENT HEALTH QUESTIONNAIRE - PHQ9
1. LITTLE INTEREST OR PLEASURE IN DOING THINGS: NOT AT ALL
SUM OF ALL RESPONSES TO PHQ9 QUESTIONS 1 AND 2: 1
CLINICAL INTERPRETATION OF PHQ2 SCORE: NO FURTHER SCREENING NEEDED
2. FEELING DOWN, DEPRESSED OR HOPELESS: SEVERAL DAYS
SUM OF ALL RESPONSES TO PHQ9 QUESTIONS 1 AND 2: 1

## 2023-05-18 ENCOUNTER — APPOINTMENT (OUTPATIENT)
Dept: CARDIOLOGY | Age: 68
End: 2023-05-18

## 2023-05-31 ENCOUNTER — TELEPHONE (OUTPATIENT)
Dept: SLEEP MEDICINE | Age: 68
End: 2023-05-31

## 2023-05-31 DIAGNOSIS — G47.33 OSA (OBSTRUCTIVE SLEEP APNEA): Primary | ICD-10-CM

## 2023-06-20 RX ORDER — AMLODIPINE BESYLATE 5 MG/1
5 TABLET ORAL DAILY
Qty: 90 TABLET | Refills: 2 | Status: SHIPPED | OUTPATIENT
Start: 2023-06-20

## 2023-08-03 ENCOUNTER — OFFICE VISIT (OUTPATIENT)
Dept: CARDIOLOGY | Age: 68
End: 2023-08-03

## 2023-08-03 VITALS
HEART RATE: 83 BPM | HEIGHT: 62 IN | BODY MASS INDEX: 24.34 KG/M2 | WEIGHT: 132.28 LBS | SYSTOLIC BLOOD PRESSURE: 115 MMHG | DIASTOLIC BLOOD PRESSURE: 71 MMHG

## 2023-08-03 DIAGNOSIS — I10 PRIMARY HYPERTENSION: ICD-10-CM

## 2023-08-03 DIAGNOSIS — Z95.5 PRESENCE OF DRUG-ELUTING STENT IN ANTERIOR DESCENDING BRANCH OF LEFT CORONARY ARTERY: ICD-10-CM

## 2023-08-03 DIAGNOSIS — Z95.5 PRESENCE OF DRUG-ELUTING STENT IN RIGHT CORONARY ARTERY: Primary | ICD-10-CM

## 2023-08-03 DIAGNOSIS — E78.2 MIXED HYPERLIPIDEMIA: ICD-10-CM

## 2023-08-03 DIAGNOSIS — R53.83 FATIGUE, UNSPECIFIED TYPE: ICD-10-CM

## 2023-08-03 DIAGNOSIS — E11.9 DIABETES MELLITUS TYPE 2, NONINSULIN DEPENDENT (CMD): ICD-10-CM

## 2023-08-03 DIAGNOSIS — I25.10 CORONARY ARTERY DISEASE INVOLVING NATIVE CORONARY ARTERY OF NATIVE HEART WITHOUT ANGINA PECTORIS: ICD-10-CM

## 2023-08-03 PROCEDURE — 3078F DIAST BP <80 MM HG: CPT | Performed by: SPECIALIST

## 2023-08-03 PROCEDURE — 99214 OFFICE O/P EST MOD 30 MIN: CPT | Performed by: SPECIALIST

## 2023-08-03 PROCEDURE — 3074F SYST BP LT 130 MM HG: CPT | Performed by: SPECIALIST

## 2023-08-03 RX ORDER — LOSARTAN POTASSIUM AND HYDROCHLOROTHIAZIDE 12.5; 1 MG/1; MG/1
1 TABLET ORAL DAILY
COMMUNITY

## 2023-08-03 RX ORDER — METOPROLOL SUCCINATE 50 MG/1
50 TABLET, EXTENDED RELEASE ORAL DAILY
Qty: 90 TABLET | Refills: 1 | Status: SHIPPED | OUTPATIENT
Start: 2023-08-03

## 2023-08-03 RX ORDER — MAGNESIUM OXIDE 400 MG/1
TABLET ORAL
COMMUNITY
Start: 2023-05-08

## 2023-08-03 SDOH — HEALTH STABILITY: PHYSICAL HEALTH: ON AVERAGE, HOW MANY DAYS PER WEEK DO YOU ENGAGE IN MODERATE TO STRENUOUS EXERCISE (LIKE A BRISK WALK)?: 3 DAYS

## 2023-08-03 SDOH — HEALTH STABILITY: PHYSICAL HEALTH: ON AVERAGE, HOW MANY MINUTES DO YOU ENGAGE IN EXERCISE AT THIS LEVEL?: 10 MIN

## 2023-08-03 ASSESSMENT — PATIENT HEALTH QUESTIONNAIRE - PHQ9
1. LITTLE INTEREST OR PLEASURE IN DOING THINGS: NOT AT ALL
SUM OF ALL RESPONSES TO PHQ9 QUESTIONS 1 AND 2: 0
SUM OF ALL RESPONSES TO PHQ9 QUESTIONS 1 AND 2: 0
CLINICAL INTERPRETATION OF PHQ2 SCORE: NO FURTHER SCREENING NEEDED
2. FEELING DOWN, DEPRESSED OR HOPELESS: NOT AT ALL

## 2023-09-27 ENCOUNTER — HOSPITAL ENCOUNTER (OUTPATIENT)
Dept: MAMMOGRAPHY | Age: 68
Discharge: HOME | End: 2023-09-27
Attending: INTERNAL MEDICINE
Payer: MEDICARE

## 2023-09-27 DIAGNOSIS — Z12.31 BREAST CANCER SCREENING BY MAMMOGRAM: ICD-10-CM

## 2023-09-27 PROCEDURE — 77067 SCR MAMMO BI INCL CAD: CPT | Performed by: INTERNAL MEDICINE

## 2023-09-27 PROCEDURE — 77063 BREAST TOMOSYNTHESIS BI: CPT | Performed by: INTERNAL MEDICINE

## 2023-10-10 RX ORDER — POTASSIUM CHLORIDE 1500 MG/1
20 TABLET, EXTENDED RELEASE ORAL DAILY
Qty: 90 TABLET | Refills: 3 | Status: SHIPPED | OUTPATIENT
Start: 2023-10-10

## 2023-10-12 RX ORDER — ATORVASTATIN CALCIUM 20 MG/1
20 TABLET, FILM COATED ORAL DAILY
Qty: 90 TABLET | Refills: 3 | Status: SHIPPED | OUTPATIENT
Start: 2023-10-12

## 2023-11-20 ENCOUNTER — TELEPHONE (OUTPATIENT)
Dept: SLEEP MEDICINE | Age: 68
End: 2023-11-20

## 2023-12-05 ENCOUNTER — TELEPHONE (OUTPATIENT)
Dept: SLEEP MEDICINE | Age: 68
End: 2023-12-05

## 2023-12-12 DIAGNOSIS — G47.33 OSA (OBSTRUCTIVE SLEEP APNEA): Primary | ICD-10-CM

## 2023-12-21 ENCOUNTER — EXTERNAL LAB (OUTPATIENT)
Dept: OTHER | Age: 68
End: 2023-12-21

## 2023-12-21 LAB — LAB RESULT: NORMAL

## 2024-01-18 ENCOUNTER — APPOINTMENT (OUTPATIENT)
Dept: CARDIOLOGY | Age: 69
End: 2024-01-18

## 2024-01-18 ENCOUNTER — EXTERNAL RECORD (OUTPATIENT)
Dept: HEALTH INFORMATION MANAGEMENT | Facility: OTHER | Age: 69
End: 2024-01-18

## 2024-01-18 VITALS
SYSTOLIC BLOOD PRESSURE: 138 MMHG | BODY MASS INDEX: 23.12 KG/M2 | HEIGHT: 62 IN | DIASTOLIC BLOOD PRESSURE: 75 MMHG | HEART RATE: 76 BPM | WEIGHT: 125.66 LBS

## 2024-01-18 DIAGNOSIS — I25.10 CORONARY ARTERY DISEASE INVOLVING NATIVE CORONARY ARTERY OF NATIVE HEART WITHOUT ANGINA PECTORIS: ICD-10-CM

## 2024-01-18 DIAGNOSIS — I10 PRIMARY HYPERTENSION: ICD-10-CM

## 2024-01-18 DIAGNOSIS — G47.39 OTHER SLEEP APNEA: ICD-10-CM

## 2024-01-18 DIAGNOSIS — E78.2 MIXED HYPERLIPIDEMIA: ICD-10-CM

## 2024-01-18 DIAGNOSIS — Z95.5 PRESENCE OF DRUG-ELUTING STENT IN RIGHT CORONARY ARTERY: ICD-10-CM

## 2024-01-18 DIAGNOSIS — Z95.5 PRESENCE OF DRUG-ELUTING STENT IN ANTERIOR DESCENDING BRANCH OF LEFT CORONARY ARTERY: Primary | ICD-10-CM

## 2024-01-18 DIAGNOSIS — E11.9 DIABETES MELLITUS TYPE 2, NONINSULIN DEPENDENT (CMD): ICD-10-CM

## 2024-01-18 PROCEDURE — 3075F SYST BP GE 130 - 139MM HG: CPT | Performed by: SPECIALIST

## 2024-01-18 PROCEDURE — 99214 OFFICE O/P EST MOD 30 MIN: CPT | Performed by: SPECIALIST

## 2024-01-18 PROCEDURE — 3078F DIAST BP <80 MM HG: CPT | Performed by: SPECIALIST

## 2024-01-18 RX ORDER — LOSARTAN POTASSIUM 50 MG/1
50 TABLET ORAL DAILY
COMMUNITY
End: 2024-01-18 | Stop reason: SDUPTHER

## 2024-01-18 RX ORDER — LOSARTAN POTASSIUM 50 MG/1
50 TABLET ORAL DAILY
Qty: 90 TABLET | Refills: 1 | Status: SHIPPED | OUTPATIENT
Start: 2024-01-18

## 2024-01-18 SDOH — HEALTH STABILITY: PHYSICAL HEALTH: ON AVERAGE, HOW MANY MINUTES DO YOU ENGAGE IN EXERCISE AT THIS LEVEL?: 30 MIN

## 2024-01-18 SDOH — HEALTH STABILITY: PHYSICAL HEALTH: ON AVERAGE, HOW MANY DAYS PER WEEK DO YOU ENGAGE IN MODERATE TO STRENUOUS EXERCISE (LIKE A BRISK WALK)?: 2 DAYS

## 2024-01-18 ASSESSMENT — PATIENT HEALTH QUESTIONNAIRE - PHQ9
SUM OF ALL RESPONSES TO PHQ9 QUESTIONS 1 AND 2: 1
1. LITTLE INTEREST OR PLEASURE IN DOING THINGS: NOT AT ALL
SUM OF ALL RESPONSES TO PHQ9 QUESTIONS 1 AND 2: 1
2. FEELING DOWN, DEPRESSED OR HOPELESS: SEVERAL DAYS
CLINICAL INTERPRETATION OF PHQ2 SCORE: NO FURTHER SCREENING NEEDED

## 2024-04-22 RX ORDER — METOPROLOL SUCCINATE 50 MG/1
50 TABLET, EXTENDED RELEASE ORAL DAILY
Qty: 90 TABLET | Refills: 3 | Status: SHIPPED | OUTPATIENT
Start: 2024-04-22

## 2024-05-04 PROBLEM — N32.81 OAB (OVERACTIVE BLADDER): Status: ACTIVE | Noted: 2024-05-04

## 2024-05-04 PROBLEM — M51.369 DEGENERATION OF LUMBAR INTERVERTEBRAL DISC: Status: ACTIVE | Noted: 2024-05-04

## 2024-05-04 PROBLEM — E11.40 DIABETIC NEUROPATHY ASSOCIATED WITH TYPE 2 DIABETES MELLITUS  (CMD): Status: ACTIVE | Noted: 2024-05-04

## 2024-05-04 PROBLEM — G47.33 OBSTRUCTIVE SLEEP APNEA: Status: ACTIVE | Noted: 2022-06-07

## 2024-05-04 PROBLEM — R41.3 MEMORY LOSS: Status: ACTIVE | Noted: 2024-05-04

## 2024-05-04 PROBLEM — E11.65 TYPE 2 DIABETES MELLITUS WITH HYPERGLYCEMIA  (CMD): Status: ACTIVE | Noted: 2024-05-04

## 2024-05-04 PROBLEM — M51.36 DEGENERATION OF LUMBAR INTERVERTEBRAL DISC: Status: ACTIVE | Noted: 2024-05-04

## 2024-05-04 PROBLEM — E78.2 MIXED HYPERLIPIDEMIA: Status: ACTIVE | Noted: 2022-06-07

## 2024-05-23 ENCOUNTER — APPOINTMENT (OUTPATIENT)
Dept: CARDIOLOGY | Age: 69
End: 2024-05-23

## 2024-05-30 PROBLEM — M54.12 CERVICAL RADICULOPATHY: Status: ACTIVE | Noted: 2024-05-30

## 2024-07-01 ENCOUNTER — APPOINTMENT (OUTPATIENT)
Dept: CARDIOLOGY | Age: 69
End: 2024-07-01

## 2024-07-01 VITALS
OXYGEN SATURATION: 99 % | HEART RATE: 79 BPM | HEIGHT: 62 IN | SYSTOLIC BLOOD PRESSURE: 132 MMHG | BODY MASS INDEX: 24.38 KG/M2 | DIASTOLIC BLOOD PRESSURE: 69 MMHG | WEIGHT: 132.5 LBS

## 2024-07-01 DIAGNOSIS — Z95.5 PRESENCE OF DRUG-ELUTING STENT IN RIGHT CORONARY ARTERY: ICD-10-CM

## 2024-07-01 DIAGNOSIS — R07.9 CHEST PAIN ON EXERTION: Primary | ICD-10-CM

## 2024-07-01 DIAGNOSIS — E78.2 MIXED HYPERLIPIDEMIA: ICD-10-CM

## 2024-07-01 DIAGNOSIS — E11.9 DIABETES MELLITUS TYPE 2, NONINSULIN DEPENDENT  (CMD): ICD-10-CM

## 2024-07-01 DIAGNOSIS — Z95.5 PRESENCE OF DRUG-ELUTING STENT IN ANTERIOR DESCENDING BRANCH OF LEFT CORONARY ARTERY: ICD-10-CM

## 2024-07-01 DIAGNOSIS — I25.10 CORONARY ARTERY DISEASE INVOLVING NATIVE CORONARY ARTERY OF NATIVE HEART WITHOUT ANGINA PECTORIS: ICD-10-CM

## 2024-07-01 DIAGNOSIS — I10 PRIMARY HYPERTENSION: ICD-10-CM

## 2024-07-01 DIAGNOSIS — R06.02 SOB (SHORTNESS OF BREATH): ICD-10-CM

## 2024-07-01 DIAGNOSIS — I10 ESSENTIAL HYPERTENSION: ICD-10-CM

## 2024-07-01 RX ORDER — LOSARTAN POTASSIUM 50 MG/1
50 TABLET ORAL DAILY
Qty: 90 TABLET | Refills: 1 | Status: SHIPPED | OUTPATIENT
Start: 2024-07-01

## 2024-07-01 RX ORDER — ATORVASTATIN CALCIUM 40 MG/1
40 TABLET, FILM COATED ORAL DAILY
Qty: 90 TABLET | Refills: 1 | Status: SHIPPED | OUTPATIENT
Start: 2024-07-01

## 2024-07-01 SDOH — HEALTH STABILITY: PHYSICAL HEALTH: ON AVERAGE, HOW MANY MINUTES DO YOU ENGAGE IN EXERCISE AT THIS LEVEL?: 20 MIN

## 2024-07-01 SDOH — HEALTH STABILITY: PHYSICAL HEALTH: ON AVERAGE, HOW MANY DAYS PER WEEK DO YOU ENGAGE IN MODERATE TO STRENUOUS EXERCISE (LIKE A BRISK WALK)?: 5 DAYS

## 2024-07-01 ASSESSMENT — PATIENT HEALTH QUESTIONNAIRE - PHQ9
2. FEELING DOWN, DEPRESSED OR HOPELESS: NOT AT ALL
SUM OF ALL RESPONSES TO PHQ9 QUESTIONS 1 AND 2: 0
CLINICAL INTERPRETATION OF PHQ2 SCORE: NO FURTHER SCREENING NEEDED
SUM OF ALL RESPONSES TO PHQ9 QUESTIONS 1 AND 2: 0
2. FEELING DOWN, DEPRESSED OR HOPELESS: NOT AT ALL
SUM OF ALL RESPONSES TO PHQ9 QUESTIONS 1 AND 2: 0
CLINICAL INTERPRETATION OF PHQ2 SCORE: NO FURTHER SCREENING NEEDED
1. LITTLE INTEREST OR PLEASURE IN DOING THINGS: NOT AT ALL
SUM OF ALL RESPONSES TO PHQ9 QUESTIONS 1 AND 2: 0
1. LITTLE INTEREST OR PLEASURE IN DOING THINGS: NOT AT ALL

## 2024-08-01 ENCOUNTER — HOSPITAL ENCOUNTER (OUTPATIENT)
Dept: CARDIOLOGY | Age: 69
Discharge: HOME | End: 2024-08-01
Attending: SPECIALIST

## 2024-08-01 ENCOUNTER — TELEPHONE (OUTPATIENT)
Dept: CARDIOLOGY | Age: 69
End: 2024-08-01

## 2024-08-01 DIAGNOSIS — Z95.5 PRESENCE OF DRUG-ELUTING STENT IN RIGHT CORONARY ARTERY: ICD-10-CM

## 2024-08-01 DIAGNOSIS — E11.9 DIABETES MELLITUS TYPE 2, NONINSULIN DEPENDENT  (CMD): ICD-10-CM

## 2024-08-01 DIAGNOSIS — R06.02 SOB (SHORTNESS OF BREATH): ICD-10-CM

## 2024-08-01 DIAGNOSIS — E78.2 MIXED HYPERLIPIDEMIA: ICD-10-CM

## 2024-08-01 DIAGNOSIS — I25.10 CORONARY ARTERY DISEASE INVOLVING NATIVE CORONARY ARTERY OF NATIVE HEART WITHOUT ANGINA PECTORIS: ICD-10-CM

## 2024-08-01 DIAGNOSIS — I10 ESSENTIAL HYPERTENSION: ICD-10-CM

## 2024-08-01 DIAGNOSIS — Z95.5 PRESENCE OF DRUG-ELUTING STENT IN ANTERIOR DESCENDING BRANCH OF LEFT CORONARY ARTERY: ICD-10-CM

## 2024-08-01 DIAGNOSIS — I10 PRIMARY HYPERTENSION: ICD-10-CM

## 2024-08-01 PROCEDURE — 10002800 HB RX 250 W HCPCS: Performed by: SPECIALIST

## 2024-08-01 PROCEDURE — A9500 TC99M SESTAMIBI: HCPCS | Performed by: SPECIALIST

## 2024-08-01 PROCEDURE — 78452 HT MUSCLE IMAGE SPECT MULT: CPT

## 2024-08-01 PROCEDURE — 10006150 HB RX 343: Performed by: SPECIALIST

## 2024-08-01 PROCEDURE — 93017 CV STRESS TEST TRACING ONLY: CPT

## 2024-08-01 PROCEDURE — 76376 3D RENDER W/INTRP POSTPROCES: CPT

## 2024-08-01 RX ORDER — TETRAKIS(2-METHOXYISOBUTYLISOCYANIDE)COPPER(I) TETRAFLUOROBORATE 1 MG/ML
24 INJECTION, POWDER, LYOPHILIZED, FOR SOLUTION INTRAVENOUS ONCE
Status: COMPLETED | OUTPATIENT
Start: 2024-08-01 | End: 2024-08-01

## 2024-08-01 RX ORDER — REGADENOSON 0.08 MG/ML
0.4 INJECTION, SOLUTION INTRAVENOUS ONCE
Status: COMPLETED | OUTPATIENT
Start: 2024-08-01 | End: 2024-08-01

## 2024-08-01 RX ORDER — TETRAKIS(2-METHOXYISOBUTYLISOCYANIDE)COPPER(I) TETRAFLUOROBORATE 1 MG/ML
8 INJECTION, POWDER, LYOPHILIZED, FOR SOLUTION INTRAVENOUS ONCE
Status: COMPLETED | OUTPATIENT
Start: 2024-08-01 | End: 2024-08-01

## 2024-08-01 RX ADMIN — TETRAKIS(2-METHOXYISOBUTYLISOCYANIDE)COPPER(I) TETRAFLUOROBORATE 25.1 MILLICURIE: 1 INJECTION, POWDER, LYOPHILIZED, FOR SOLUTION INTRAVENOUS at 13:18

## 2024-08-01 RX ADMIN — REGADENOSON 0.4 MG: 0.08 INJECTION, SOLUTION INTRAVENOUS at 13:12

## 2024-08-01 RX ADMIN — TETRAKIS(2-METHOXYISOBUTYLISOCYANIDE)COPPER(I) TETRAFLUOROBORATE 8.7 MILLICURIE: 1 INJECTION, POWDER, LYOPHILIZED, FOR SOLUTION INTRAVENOUS at 12:05

## 2024-09-12 PROBLEM — E11.9 DIABETES MELLITUS TYPE 2, NONINSULIN DEPENDENT  (CMD): Status: RESOLVED | Noted: 2022-06-07 | Resolved: 2024-09-12

## 2024-10-22 ENCOUNTER — APPOINTMENT (OUTPATIENT)
Dept: CARDIOLOGY | Age: 69
End: 2024-10-22

## 2024-11-12 ENCOUNTER — TELEPHONE (OUTPATIENT)
Dept: CARDIOLOGY | Age: 69
End: 2024-11-12

## 2024-11-12 ENCOUNTER — APPOINTMENT (OUTPATIENT)
Dept: CARDIOLOGY | Age: 69
End: 2024-11-12

## 2024-11-12 VITALS
HEART RATE: 78 BPM | WEIGHT: 132.28 LBS | DIASTOLIC BLOOD PRESSURE: 80 MMHG | SYSTOLIC BLOOD PRESSURE: 140 MMHG | HEIGHT: 62 IN | BODY MASS INDEX: 24.34 KG/M2

## 2024-11-12 DIAGNOSIS — E78.2 MIXED HYPERLIPIDEMIA: ICD-10-CM

## 2024-11-12 DIAGNOSIS — I10 ESSENTIAL HYPERTENSION: ICD-10-CM

## 2024-11-12 DIAGNOSIS — Z95.5 PRESENCE OF DRUG-ELUTING STENT IN RIGHT CORONARY ARTERY: ICD-10-CM

## 2024-11-12 DIAGNOSIS — E11.9 DIABETES MELLITUS TYPE 2, NONINSULIN DEPENDENT  (CMD): ICD-10-CM

## 2024-11-12 DIAGNOSIS — I25.10 CORONARY ARTERY DISEASE INVOLVING NATIVE CORONARY ARTERY OF NATIVE HEART WITHOUT ANGINA PECTORIS: ICD-10-CM

## 2024-11-12 DIAGNOSIS — Z95.5 PRESENCE OF DRUG-ELUTING STENT IN ANTERIOR DESCENDING BRANCH OF LEFT CORONARY ARTERY: Primary | ICD-10-CM

## 2024-11-12 RX ORDER — OLMESARTAN MEDOXOMIL 40 MG/1
40 TABLET ORAL DAILY
Qty: 90 TABLET | Refills: 1 | Status: SHIPPED | OUTPATIENT
Start: 2024-11-12

## 2024-11-15 ENCOUNTER — TELEPHONE (OUTPATIENT)
Dept: CARDIOLOGY | Age: 69
End: 2024-11-15

## 2024-11-23 ENCOUNTER — NURSE TRIAGE (OUTPATIENT)
Dept: TELEHEALTH | Age: 69
End: 2024-11-23

## 2024-11-25 ENCOUNTER — TELEPHONE (OUTPATIENT)
Dept: CARDIOLOGY | Age: 69
End: 2024-11-25

## 2024-11-25 DIAGNOSIS — R06.02 SOB (SHORTNESS OF BREATH): Primary | ICD-10-CM

## 2024-11-25 DIAGNOSIS — I10 PRIMARY HYPERTENSION: ICD-10-CM

## 2024-11-25 DIAGNOSIS — I25.10 CORONARY ARTERY DISEASE INVOLVING NATIVE CORONARY ARTERY OF NATIVE HEART WITHOUT ANGINA PECTORIS: ICD-10-CM

## 2024-11-26 RX ORDER — OLMESARTAN MEDOXOMIL AND HYDROCHLOROTHIAZIDE 40/25 40; 25 MG/1; MG/1
1 TABLET ORAL DAILY
Qty: 90 TABLET | Refills: 1 | Status: SHIPPED | OUTPATIENT
Start: 2024-11-26

## 2024-12-02 ENCOUNTER — APPOINTMENT (OUTPATIENT)
Dept: CT IMAGING | Age: 69
End: 2024-12-02
Attending: SPECIALIST

## 2024-12-03 ENCOUNTER — HOSPITAL ENCOUNTER (OUTPATIENT)
Dept: MAMMOGRAPHY | Age: 69
Discharge: HOME | End: 2024-12-03
Attending: INTERNAL MEDICINE
Payer: MEDICARE

## 2024-12-03 ENCOUNTER — APPOINTMENT (OUTPATIENT)
Dept: CT IMAGING | Age: 69
End: 2024-12-03
Attending: SPECIALIST

## 2024-12-03 DIAGNOSIS — Z12.31 ENCOUNTER FOR SCREENING MAMMOGRAM FOR MALIGNANT NEOPLASM OF BREAST: ICD-10-CM

## 2024-12-03 LAB — HM MAMMOGRAPHY BILATERAL: NORMAL

## 2024-12-03 PROCEDURE — 77063 BREAST TOMOSYNTHESIS BI: CPT | Performed by: INTERNAL MEDICINE

## 2024-12-03 PROCEDURE — 77067 SCR MAMMO BI INCL CAD: CPT | Performed by: INTERNAL MEDICINE

## 2024-12-09 ENCOUNTER — TELEPHONE (OUTPATIENT)
Dept: CARDIOLOGY | Age: 69
End: 2024-12-09

## 2024-12-10 RX ORDER — POTASSIUM CHLORIDE 750 MG/1
10 TABLET, EXTENDED RELEASE ORAL 2 TIMES DAILY
Qty: 60 TABLET | Refills: 0 | Status: SHIPPED | OUTPATIENT
Start: 2024-12-10 | End: 2024-12-14 | Stop reason: SDUPTHER

## 2024-12-14 PROBLEM — H61.22 IMPACTED CERUMEN OF LEFT EAR: Status: ACTIVE | Noted: 2024-12-14

## 2024-12-20 ENCOUNTER — CLINICAL ABSTRACT (OUTPATIENT)
Age: 69
End: 2024-12-20

## 2024-12-20 ENCOUNTER — APPOINTMENT (OUTPATIENT)
Dept: CT IMAGING | Age: 69
End: 2024-12-20
Attending: SPECIALIST

## 2025-01-02 ENCOUNTER — APPOINTMENT (OUTPATIENT)
Dept: CT IMAGING | Age: 70
End: 2025-01-02
Attending: SPECIALIST

## 2025-01-06 ENCOUNTER — TELEPHONE (OUTPATIENT)
Dept: CARDIOLOGY | Age: 70
End: 2025-01-06

## 2025-01-06 ENCOUNTER — APPOINTMENT (OUTPATIENT)
Dept: CT IMAGING | Age: 70
End: 2025-01-06
Attending: SPECIALIST

## 2025-01-06 DIAGNOSIS — E78.2 MIXED HYPERLIPIDEMIA: ICD-10-CM

## 2025-01-06 DIAGNOSIS — I25.10 CORONARY ARTERY DISEASE INVOLVING NATIVE CORONARY ARTERY OF NATIVE HEART WITHOUT ANGINA PECTORIS: ICD-10-CM

## 2025-01-06 DIAGNOSIS — I10 PRIMARY HYPERTENSION: Primary | ICD-10-CM

## 2025-01-06 DIAGNOSIS — I10 ESSENTIAL HYPERTENSION: ICD-10-CM

## 2025-01-06 DIAGNOSIS — Z95.5 PRESENCE OF DRUG-ELUTING STENT IN ANTERIOR DESCENDING BRANCH OF LEFT CORONARY ARTERY: ICD-10-CM

## 2025-01-06 DIAGNOSIS — E11.9 DIABETES MELLITUS TYPE 2, NONINSULIN DEPENDENT  (CMD): ICD-10-CM

## 2025-01-13 ENCOUNTER — E-ADVICE (OUTPATIENT)
Dept: CARDIOLOGY | Age: 70
End: 2025-01-13

## 2025-01-30 ENCOUNTER — APPOINTMENT (OUTPATIENT)
Dept: ULTRASOUND IMAGING | Age: 70
End: 2025-01-30
Attending: SPECIALIST

## 2025-02-13 ENCOUNTER — HOSPITAL ENCOUNTER (OUTPATIENT)
Dept: CT IMAGING | Age: 70
Discharge: HOME | End: 2025-02-13
Attending: INTERNAL MEDICINE

## 2025-02-13 ENCOUNTER — HOSPITAL ENCOUNTER (OUTPATIENT)
Dept: ULTRASOUND IMAGING | Age: 70
Discharge: HOME | End: 2025-02-13
Attending: INTERNAL MEDICINE

## 2025-02-13 DIAGNOSIS — M81.0 AGE-RELATED OSTEOPOROSIS WITHOUT CURRENT PATHOLOGICAL FRACTURE: ICD-10-CM

## 2025-02-13 DIAGNOSIS — I65.23 BILATERAL CAROTID ARTERY STENOSIS: ICD-10-CM

## 2025-02-13 LAB
DEXA FRACTURE RISK HIP: NORMAL
DEXA FRACTURE RISK MAJOR: NORMAL
DEXA LT FEMNECK TSCORE: -1
DEXA LT FEMNECK ZSCORE: 0.8
DEXA LT HIP FRAX: NORMAL
DEXA LT MAJOR OSTEO FRAX: NORMAL
DEXA LT TOTFEM TSCORE: -0.9
DEXA LT TOTFEM ZSCORE: 0.6
DEXA SPINE L1-L2 T-SCORE: -2
DEXA SPINE L1-L2 Z-SCORE: 0
DEXA SPINE L1-L4 T-SCORE: -1.9
DEXA SPINE L1-L4 Z-SCORE: 0.3
DEXA SPINE L2-L3 T-SCORE: -2
DEXA SPINE L2-L3 Z-SCORE: 0.1
DEXA SPINE L3-L4 T-SCORE: -1.8
DEXA SPINE L3-L4 Z-SCORE: 0.5

## 2025-02-13 PROCEDURE — 77080 DXA BONE DENSITY AXIAL: CPT

## 2025-02-13 PROCEDURE — 93880 EXTRACRANIAL BILAT STUDY: CPT

## 2025-02-14 ENCOUNTER — HOSPITAL ENCOUNTER (OUTPATIENT)
Dept: ULTRASOUND IMAGING | Age: 70
Discharge: HOME | End: 2025-02-14
Attending: SPECIALIST

## 2025-02-14 DIAGNOSIS — I10 PRIMARY HYPERTENSION: ICD-10-CM

## 2025-02-14 DIAGNOSIS — E78.2 MIXED HYPERLIPIDEMIA: ICD-10-CM

## 2025-02-14 DIAGNOSIS — E11.9 DIABETES MELLITUS TYPE 2, NONINSULIN DEPENDENT  (CMD): ICD-10-CM

## 2025-02-14 DIAGNOSIS — I10 ESSENTIAL HYPERTENSION: ICD-10-CM

## 2025-02-14 DIAGNOSIS — Z95.5 PRESENCE OF DRUG-ELUTING STENT IN ANTERIOR DESCENDING BRANCH OF LEFT CORONARY ARTERY: ICD-10-CM

## 2025-02-14 DIAGNOSIS — I25.10 CORONARY ARTERY DISEASE INVOLVING NATIVE CORONARY ARTERY OF NATIVE HEART WITHOUT ANGINA PECTORIS: ICD-10-CM

## 2025-02-14 PROCEDURE — 93975 VASCULAR STUDY: CPT

## 2025-03-03 PROBLEM — I65.22 STENOSIS OF LEFT CAROTID ARTERY: Status: ACTIVE | Noted: 2025-03-03

## 2025-03-10 ENCOUNTER — APPOINTMENT (OUTPATIENT)
Dept: GASTROENTEROLOGY | Age: 70
End: 2025-03-10
Attending: INTERNAL MEDICINE

## 2025-03-13 ENCOUNTER — APPOINTMENT (OUTPATIENT)
Dept: CARDIOLOGY | Age: 70
End: 2025-03-13

## 2025-03-14 PROBLEM — J98.8 VIRAL RESPIRATORY ILLNESS: Status: ACTIVE | Noted: 2025-03-14

## 2025-03-14 PROBLEM — B97.89 VIRAL RESPIRATORY ILLNESS: Status: ACTIVE | Noted: 2025-03-14

## 2025-03-25 ENCOUNTER — HOSPITAL ENCOUNTER (OUTPATIENT)
Dept: CT IMAGING | Age: 70
Discharge: HOME | End: 2025-03-25
Attending: INTERNAL MEDICINE

## 2025-03-25 DIAGNOSIS — I65.22 STENOSIS OF LEFT CAROTID ARTERY: ICD-10-CM

## 2025-03-25 PROCEDURE — 10002805 HB CONTRAST AGENT: Performed by: INTERNAL MEDICINE

## 2025-03-25 PROCEDURE — 70498 CT ANGIOGRAPHY NECK: CPT

## 2025-03-25 PROCEDURE — 70496 CT ANGIOGRAPHY HEAD: CPT

## 2025-03-25 RX ADMIN — IOHEXOL 70 ML: 350 INJECTION, SOLUTION INTRAVENOUS at 11:16

## 2025-04-03 ENCOUNTER — APPOINTMENT (OUTPATIENT)
Dept: CARDIOLOGY | Age: 70
End: 2025-04-03

## 2025-04-03 VITALS
HEART RATE: 75 BPM | WEIGHT: 131.94 LBS | HEIGHT: 62 IN | BODY MASS INDEX: 24.28 KG/M2 | RESPIRATION RATE: 18 BRPM | OXYGEN SATURATION: 98 % | SYSTOLIC BLOOD PRESSURE: 122 MMHG | DIASTOLIC BLOOD PRESSURE: 68 MMHG

## 2025-04-03 DIAGNOSIS — Z95.5 PRESENCE OF DRUG-ELUTING STENT IN ANTERIOR DESCENDING BRANCH OF LEFT CORONARY ARTERY: ICD-10-CM

## 2025-04-03 DIAGNOSIS — G47.33 OBSTRUCTIVE SLEEP APNEA ON CPAP: ICD-10-CM

## 2025-04-03 DIAGNOSIS — I65.22 STENOSIS OF LEFT CAROTID ARTERY: Primary | ICD-10-CM

## 2025-04-03 DIAGNOSIS — E11.65 TYPE 2 DIABETES MELLITUS WITH HYPERGLYCEMIA, WITHOUT LONG-TERM CURRENT USE OF INSULIN (CMD): ICD-10-CM

## 2025-04-03 DIAGNOSIS — I10 PRIMARY HYPERTENSION: ICD-10-CM

## 2025-04-03 SDOH — HEALTH STABILITY: PHYSICAL HEALTH: ON AVERAGE, HOW MANY MINUTES DO YOU ENGAGE IN EXERCISE AT THIS LEVEL?: 0 MIN

## 2025-04-03 SDOH — HEALTH STABILITY: PHYSICAL HEALTH: ON AVERAGE, HOW MANY DAYS PER WEEK DO YOU ENGAGE IN MODERATE TO STRENUOUS EXERCISE (LIKE A BRISK WALK)?: 0 DAYS

## 2025-04-03 ASSESSMENT — PATIENT HEALTH QUESTIONNAIRE - PHQ9
SUM OF ALL RESPONSES TO PHQ9 QUESTIONS 1 AND 2: 0
2. FEELING DOWN, DEPRESSED OR HOPELESS: NOT AT ALL
CLINICAL INTERPRETATION OF PHQ2 SCORE: NO FURTHER SCREENING NEEDED
1. LITTLE INTEREST OR PLEASURE IN DOING THINGS: NOT AT ALL
SUM OF ALL RESPONSES TO PHQ9 QUESTIONS 1 AND 2: 0

## 2025-04-09 ENCOUNTER — TELEPHONE (OUTPATIENT)
Dept: CARDIOLOGY | Age: 70
End: 2025-04-09

## 2025-04-09 DIAGNOSIS — I65.22 STENOSIS OF LEFT CAROTID ARTERY: Primary | ICD-10-CM

## 2025-04-21 ENCOUNTER — APPOINTMENT (OUTPATIENT)
Dept: GASTROENTEROLOGY | Age: 70
End: 2025-04-21
Attending: INTERNAL MEDICINE

## 2025-04-23 ENCOUNTER — APPOINTMENT (OUTPATIENT)
Age: 70
End: 2025-04-23

## 2025-04-23 ENCOUNTER — TELEPHONE (OUTPATIENT)
Dept: CARDIOLOGY | Age: 70
End: 2025-04-23

## 2025-04-23 VITALS
DIASTOLIC BLOOD PRESSURE: 75 MMHG | OXYGEN SATURATION: 98 % | BODY MASS INDEX: 23.99 KG/M2 | SYSTOLIC BLOOD PRESSURE: 123 MMHG | WEIGHT: 131.17 LBS | HEART RATE: 76 BPM

## 2025-04-23 DIAGNOSIS — I65.22 STENOSIS OF LEFT CAROTID ARTERY: Primary | ICD-10-CM

## 2025-04-23 PROCEDURE — 3074F SYST BP LT 130 MM HG: CPT | Performed by: SURGERY

## 2025-04-23 PROCEDURE — 99204 OFFICE O/P NEW MOD 45 MIN: CPT | Performed by: SURGERY

## 2025-04-23 PROCEDURE — 3078F DIAST BP <80 MM HG: CPT | Performed by: SURGERY

## 2025-04-24 ASSESSMENT — ENCOUNTER SYMPTOMS
SHORTNESS OF BREATH: 0
NAUSEA: 0
SEIZURES: 0
TROUBLE SWALLOWING: 0
SORE THROAT: 0
WHEEZING: 0
DIZZINESS: 0
VOMITING: 0
ABDOMINAL PAIN: 0

## 2025-04-28 ENCOUNTER — PREP FOR CASE (OUTPATIENT)
Dept: CARDIOLOGY | Age: 70
End: 2025-04-28

## 2025-04-28 ENCOUNTER — E-ADVICE (OUTPATIENT)
Dept: CARDIOLOGY | Age: 70
End: 2025-04-28

## 2025-04-28 DIAGNOSIS — I65.22 STENOSIS OF LEFT CAROTID ARTERY: Primary | ICD-10-CM

## 2025-04-28 RX ORDER — HYDRALAZINE HYDROCHLORIDE 20 MG/ML
10 INJECTION INTRAMUSCULAR; INTRAVENOUS EVERY 4 HOURS PRN
Status: CANCELLED | OUTPATIENT
Start: 2025-04-28 | End: 2025-04-29

## 2025-04-28 RX ORDER — SODIUM CHLORIDE 9 MG/ML
INJECTION, SOLUTION INTRAVENOUS CONTINUOUS
Status: CANCELLED | OUTPATIENT
Start: 2025-04-28

## 2025-04-28 RX ORDER — 0.9 % SODIUM CHLORIDE 0.9 %
2 VIAL (ML) INJECTION EVERY 12 HOURS SCHEDULED
Status: CANCELLED | OUTPATIENT
Start: 2025-04-28

## 2025-04-28 RX ORDER — CLONIDINE HYDROCHLORIDE 0.1 MG/1
0.1 TABLET ORAL EVERY 4 HOURS PRN
Status: CANCELLED | OUTPATIENT
Start: 2025-04-28 | End: 2025-04-29

## 2025-04-28 RX ORDER — 0.9 % SODIUM CHLORIDE 0.9 %
10 VIAL (ML) INJECTION PRN
Status: CANCELLED | OUTPATIENT
Start: 2025-04-28

## 2025-04-28 RX ORDER — ACETAMINOPHEN 325 MG/1
650 TABLET ORAL EVERY 4 HOURS PRN
Status: CANCELLED | OUTPATIENT
Start: 2025-04-28

## 2025-05-16 PROBLEM — R93.89 ABNORMAL CT OF THE CHEST: Status: ACTIVE | Noted: 2025-05-16

## 2025-05-19 ENCOUNTER — APPOINTMENT (OUTPATIENT)
Dept: GASTROENTEROLOGY | Age: 70
End: 2025-05-19
Attending: INTERNAL MEDICINE

## 2025-05-23 RX ORDER — MULTIVITAMIN
1 TABLET ORAL DAILY
COMMUNITY

## 2025-05-23 RX ORDER — POTASSIUM CHLORIDE 750 MG/1
10 TABLET, EXTENDED RELEASE ORAL EVERY 12 HOURS
COMMUNITY

## 2025-05-27 ENCOUNTER — HOSPITAL ENCOUNTER (INPATIENT)
Age: 70
LOS: 2 days | Discharge: HOME | DRG: 035 | End: 2025-05-29
Attending: SURGERY | Admitting: INTERNAL MEDICINE

## 2025-05-27 ENCOUNTER — ANESTHESIA EVENT (OUTPATIENT)
Dept: CARDIOLOGY | Age: 70
End: 2025-05-27

## 2025-05-27 ENCOUNTER — ANESTHESIA (OUTPATIENT)
Dept: CARDIOLOGY | Age: 70
End: 2025-05-27

## 2025-05-27 DIAGNOSIS — I65.22 STENOSIS OF LEFT CAROTID ARTERY: ICD-10-CM

## 2025-05-27 LAB
ABO + RH BLD: NORMAL
ACT BLD: 325 BASELINE/TARGET RANGES ARE SET BY CLINICIANS FOR EACH PATIENT/PROCEDURE
ANION GAP SERPL CALC-SCNC: 6 MMOL/L (ref 7–19)
BLD GP AB SCN SERPL QL GEL: NEGATIVE
BUN SERPL-MCNC: 14 MG/DL (ref 6–20)
BUN/CREAT SERPL: 29 (ref 7–25)
CALCIUM SERPL-MCNC: 9.8 MG/DL (ref 8.4–10.2)
CHLORIDE SERPL-SCNC: 106 MMOL/L (ref 97–110)
CO2 SERPL-SCNC: 29 MMOL/L (ref 21–32)
CREAT SERPL-MCNC: 0.49 MG/DL (ref 0.51–0.95)
DEPRECATED RDW RBC: 46.4 FL (ref 39–50)
EGFRCR SERPLBLD CKD-EPI 2021: >90 ML/MIN/{1.73_M2}
ERYTHROCYTE [DISTWIDTH] IN BLOOD: 15.6 % (ref 11–15)
FASTING DURATION TIME PATIENT: ABNORMAL H
GLUCOSE BLDC GLUCOMTR-MCNC: 118 MG/DL (ref 70–99)
GLUCOSE BLDC GLUCOMTR-MCNC: 122 MG/DL (ref 70–99)
GLUCOSE BLDC GLUCOMTR-MCNC: 129 MG/DL (ref 70–99)
GLUCOSE BLDC GLUCOMTR-MCNC: 214 MG/DL (ref 70–99)
GLUCOSE SERPL-MCNC: 127 MG/DL (ref 70–99)
HCT VFR BLD CALC: 41.5 % (ref 36–46.5)
HGB BLD-MCNC: 12.9 G/DL (ref 12–15.5)
MCH RBC QN AUTO: 25.3 PG (ref 26–34)
MCHC RBC AUTO-ENTMCNC: 31.1 G/DL (ref 32–36.5)
MCV RBC AUTO: 81.4 FL (ref 78–100)
NRBC BLD MANUAL-RTO: 0 /100 WBC
PLATELET # BLD AUTO: 271 K/MCL (ref 140–450)
POTASSIUM SERPL-SCNC: 3.3 MMOL/L (ref 3.4–5.1)
RBC # BLD: 5.1 MIL/MCL (ref 4–5.2)
SODIUM SERPL-SCNC: 138 MMOL/L (ref 135–145)
TYPE AND SCREEN EXPIRATION DATE: NORMAL
WBC # BLD: 9.5 K/MCL (ref 4.2–11)

## 2025-05-27 PROCEDURE — 10002801 HB RX 250 W/O HCPCS: Performed by: ANESTHESIOLOGY

## 2025-05-27 PROCEDURE — 10006027 HB SUPPLY 278: Performed by: SURGERY

## 2025-05-27 PROCEDURE — 86850 RBC ANTIBODY SCREEN: CPT | Performed by: SURGERY

## 2025-05-27 PROCEDURE — 10002800 HB RX 250 W HCPCS: Performed by: ANESTHESIOLOGY

## 2025-05-27 PROCEDURE — 37215 TRANSCATH STENT CCA W/EPS: CPT | Performed by: SURGERY

## 2025-05-27 PROCEDURE — 10004452 HB PACU ADDL 30 MINUTES: Performed by: SURGERY

## 2025-05-27 PROCEDURE — 76937 US GUIDE VASCULAR ACCESS: CPT | Performed by: SURGERY

## 2025-05-27 PROCEDURE — 80048 BASIC METABOLIC PNL TOTAL CA: CPT | Performed by: SURGERY

## 2025-05-27 PROCEDURE — 10002805 HB CONTRAST AGENT: Performed by: SURGERY

## 2025-05-27 PROCEDURE — 10004651 HB RX, NO CHARGE ITEM: Performed by: SURGERY

## 2025-05-27 PROCEDURE — 94660 CPAP INITIATION&MGMT: CPT

## 2025-05-27 PROCEDURE — 99291 CRITICAL CARE FIRST HOUR: CPT

## 2025-05-27 PROCEDURE — 3E043XZ INTRODUCTION OF VASOPRESSOR INTO CENTRAL VEIN, PERCUTANEOUS APPROACH: ICD-10-PCS

## 2025-05-27 PROCEDURE — 037L3DZ DILATION OF LEFT INTERNAL CAROTID ARTERY WITH INTRALUMINAL DEVICE, PERCUTANEOUS APPROACH: ICD-10-PCS | Performed by: SURGERY

## 2025-05-27 PROCEDURE — 10000008 HB ROOM CHARGE ICU OR CCU

## 2025-05-27 PROCEDURE — C1760 CLOSURE DEV, VASC: HCPCS | Performed by: SURGERY

## 2025-05-27 PROCEDURE — 85347 COAGULATION TIME ACTIVATED: CPT | Performed by: SURGERY

## 2025-05-27 PROCEDURE — C1876 STENT, NON-COA/NON-COV W/DEL: HCPCS | Performed by: SURGERY

## 2025-05-27 PROCEDURE — 36415 COLL VENOUS BLD VENIPUNCTURE: CPT | Performed by: SURGERY

## 2025-05-27 PROCEDURE — 10004451 HB PACU RECOVERY 1ST 30 MINUTES: Performed by: SURGERY

## 2025-05-27 PROCEDURE — 10002803 HB RX 637

## 2025-05-27 PROCEDURE — 10002803 HB RX 637: Performed by: SURGERY

## 2025-05-27 PROCEDURE — 10002016 HB COUNTER INCENTIVE SPIROMETRY

## 2025-05-27 PROCEDURE — C1725 CATH, TRANSLUMIN NON-LASER: HCPCS | Performed by: SURGERY

## 2025-05-27 PROCEDURE — 13000004 HB  ANESTHESIA  GENERAL OUTSIDE OR: Performed by: SURGERY

## 2025-05-27 PROCEDURE — 10002801 HB RX 250 W/O HCPCS

## 2025-05-27 PROCEDURE — 10002807 HB RX 258: Performed by: ANESTHESIOLOGY

## 2025-05-27 PROCEDURE — C1769 GUIDE WIRE: HCPCS | Performed by: SURGERY

## 2025-05-27 PROCEDURE — 85027 COMPLETE CBC AUTOMATED: CPT | Performed by: SURGERY

## 2025-05-27 PROCEDURE — 82962 GLUCOSE BLOOD TEST: CPT

## 2025-05-27 PROCEDURE — 13001086 HB INCENTIVE SPIROMETER W INSTRUCT

## 2025-05-27 PROCEDURE — 99223 1ST HOSP IP/OBS HIGH 75: CPT | Performed by: INTERNAL MEDICINE

## 2025-05-27 PROCEDURE — 10002800 HB RX 250 W HCPCS

## 2025-05-27 PROCEDURE — 36223 PLACE CATH CAROTID/INOM ART: CPT | Performed by: SURGERY

## 2025-05-27 PROCEDURE — 10004180 HB COUNTER-TRANSPORT

## 2025-05-27 PROCEDURE — 10002803 HB RX 637: Performed by: INTERNAL MEDICINE

## 2025-05-27 PROCEDURE — 10002807 HB RX 258: Performed by: SURGERY

## 2025-05-27 PROCEDURE — C1894 INTRO/SHEATH, NON-LASER: HCPCS | Performed by: SURGERY

## 2025-05-27 PROCEDURE — 10006023 HB SUPPLY 272: Performed by: SURGERY

## 2025-05-27 DEVICE — 8 MM X 40 MM
Type: IMPLANTABLE DEVICE | Site: CAROTID ARTERY | Status: FUNCTIONAL
Brand: ENROUTE TRANSCAROTID STENT

## 2025-05-27 DEVICE — MYNX CONTROL™ VENOUS VASCULAR CLOSURE DEVICE 6F-12F
Type: IMPLANTABLE DEVICE | Site: GROIN | Status: FUNCTIONAL
Brand: MYNX CONTROL™ VENOUS VASCULAR CLOSURE DEVICE 6F-12F

## 2025-05-27 RX ORDER — SODIUM CHLORIDE 9 MG/ML
INJECTION, SOLUTION INTRAVENOUS CONTINUOUS PRN
Status: DISCONTINUED | OUTPATIENT
Start: 2025-05-27 | End: 2025-05-29 | Stop reason: HOSPADM

## 2025-05-27 RX ORDER — NOREPINEPHRINE BITARTRATE 0.06 MG/ML
0-10 INJECTION, SOLUTION INTRAVENOUS CONTINUOUS
Status: DISCONTINUED | OUTPATIENT
Start: 2025-05-27 | End: 2025-05-28

## 2025-05-27 RX ORDER — CLONIDINE HYDROCHLORIDE 0.1 MG/1
0.1 TABLET ORAL EVERY 4 HOURS PRN
Status: DISCONTINUED | OUTPATIENT
Start: 2025-05-27 | End: 2025-05-27 | Stop reason: HOSPADM

## 2025-05-27 RX ORDER — DEXTROSE MONOHYDRATE 25 G/50ML
25 INJECTION, SOLUTION INTRAVENOUS PRN
Status: DISCONTINUED | OUTPATIENT
Start: 2025-05-27 | End: 2025-05-29 | Stop reason: HOSPADM

## 2025-05-27 RX ORDER — GLYCOPYRROLATE 0.2 MG/ML
INJECTION, SOLUTION INTRAMUSCULAR; INTRAVENOUS PRN
Status: DISCONTINUED | OUTPATIENT
Start: 2025-05-27 | End: 2025-05-27

## 2025-05-27 RX ORDER — ACETAMINOPHEN 650 MG/1
650 SUPPOSITORY RECTAL EVERY 4 HOURS PRN
Status: DISCONTINUED | OUTPATIENT
Start: 2025-05-27 | End: 2025-05-29 | Stop reason: HOSPADM

## 2025-05-27 RX ORDER — 0.9 % SODIUM CHLORIDE 0.9 %
10 VIAL (ML) INJECTION PRN
Status: DISCONTINUED | OUTPATIENT
Start: 2025-05-27 | End: 2025-05-27 | Stop reason: HOSPADM

## 2025-05-27 RX ORDER — ACETAMINOPHEN 325 MG/1
650 TABLET ORAL
Status: DISPENSED | OUTPATIENT
Start: 2025-05-27 | End: 2025-05-27

## 2025-05-27 RX ORDER — DROPERIDOL 2.5 MG/ML
0.62 INJECTION, SOLUTION INTRAMUSCULAR; INTRAVENOUS
Status: DISCONTINUED | OUTPATIENT
Start: 2025-05-27 | End: 2025-05-27 | Stop reason: HOSPADM

## 2025-05-27 RX ORDER — AMLODIPINE BESYLATE 5 MG/1
5 TABLET ORAL EVERY EVENING
Status: DISCONTINUED | OUTPATIENT
Start: 2025-05-27 | End: 2025-05-29 | Stop reason: HOSPADM

## 2025-05-27 RX ORDER — HYDRALAZINE HYDROCHLORIDE 20 MG/ML
10 INJECTION INTRAMUSCULAR; INTRAVENOUS EVERY 4 HOURS PRN
Status: DISCONTINUED | OUTPATIENT
Start: 2025-05-27 | End: 2025-05-29 | Stop reason: HOSPADM

## 2025-05-27 RX ORDER — NALOXONE HCL 0.4 MG/ML
0.2 VIAL (ML) INJECTION EVERY 5 MIN PRN
Status: DISCONTINUED | OUTPATIENT
Start: 2025-05-27 | End: 2025-05-27 | Stop reason: HOSPADM

## 2025-05-27 RX ORDER — 0.9 % SODIUM CHLORIDE 0.9 %
10 VIAL (ML) INJECTION PRN
Status: DISCONTINUED | OUTPATIENT
Start: 2025-05-27 | End: 2025-05-29 | Stop reason: HOSPADM

## 2025-05-27 RX ORDER — DEXAMETHASONE SODIUM PHOSPHATE 4 MG/ML
INJECTION, SOLUTION INTRA-ARTICULAR; INTRALESIONAL; INTRAMUSCULAR; INTRAVENOUS; SOFT TISSUE PRN
Status: DISCONTINUED | OUTPATIENT
Start: 2025-05-27 | End: 2025-05-27

## 2025-05-27 RX ORDER — ATORVASTATIN CALCIUM 40 MG/1
40 TABLET, FILM COATED ORAL AT BEDTIME
Status: DISCONTINUED | OUTPATIENT
Start: 2025-05-27 | End: 2025-05-29 | Stop reason: HOSPADM

## 2025-05-27 RX ORDER — METOPROLOL SUCCINATE 50 MG/1
100 TABLET, EXTENDED RELEASE ORAL EVERY 24 HOURS
Status: DISCONTINUED | OUTPATIENT
Start: 2025-05-27 | End: 2025-05-29 | Stop reason: HOSPADM

## 2025-05-27 RX ORDER — PROMETHAZINE HYDROCHLORIDE 25 MG/1
25 TABLET ORAL EVERY 6 HOURS PRN
Status: DISCONTINUED | OUTPATIENT
Start: 2025-05-27 | End: 2025-05-27 | Stop reason: HOSPADM

## 2025-05-27 RX ORDER — HYDROCODONE BITARTRATE AND ACETAMINOPHEN 5; 325 MG/1; MG/1
1 TABLET ORAL EVERY 4 HOURS PRN
Status: DISCONTINUED | OUTPATIENT
Start: 2025-05-27 | End: 2025-05-29 | Stop reason: HOSPADM

## 2025-05-27 RX ORDER — NICOTINE POLACRILEX 4 MG
30 LOZENGE BUCCAL
Status: DISCONTINUED | OUTPATIENT
Start: 2025-05-27 | End: 2025-05-27 | Stop reason: HOSPADM

## 2025-05-27 RX ORDER — SODIUM CHLORIDE, SODIUM LACTATE, POTASSIUM CHLORIDE, CALCIUM CHLORIDE 600; 310; 30; 20 MG/100ML; MG/100ML; MG/100ML; MG/100ML
INJECTION, SOLUTION INTRAVENOUS CONTINUOUS PRN
Status: DISCONTINUED | OUTPATIENT
Start: 2025-05-27 | End: 2025-05-27

## 2025-05-27 RX ORDER — NICOTINE POLACRILEX 4 MG
30 LOZENGE BUCCAL PRN
Status: DISCONTINUED | OUTPATIENT
Start: 2025-05-27 | End: 2025-05-29 | Stop reason: HOSPADM

## 2025-05-27 RX ORDER — NICOTINE POLACRILEX 4 MG
15 LOZENGE BUCCAL PRN
Status: DISCONTINUED | OUTPATIENT
Start: 2025-05-27 | End: 2025-05-29 | Stop reason: HOSPADM

## 2025-05-27 RX ORDER — SODIUM CHLORIDE 9 MG/ML
INJECTION, SOLUTION INTRAVENOUS CONTINUOUS
Status: DISCONTINUED | OUTPATIENT
Start: 2025-05-27 | End: 2025-05-27 | Stop reason: HOSPADM

## 2025-05-27 RX ORDER — 0.9 % SODIUM CHLORIDE 0.9 %
2 VIAL (ML) INJECTION EVERY 12 HOURS SCHEDULED
Status: DISCONTINUED | OUTPATIENT
Start: 2025-05-27 | End: 2025-05-27 | Stop reason: HOSPADM

## 2025-05-27 RX ORDER — ACETAMINOPHEN 325 MG/1
650 TABLET ORAL EVERY 4 HOURS PRN
Status: DISCONTINUED | OUTPATIENT
Start: 2025-05-27 | End: 2025-05-27 | Stop reason: HOSPADM

## 2025-05-27 RX ORDER — DEXTROSE MONOHYDRATE 25 G/50ML
25 INJECTION, SOLUTION INTRAVENOUS PRN
Status: DISCONTINUED | OUTPATIENT
Start: 2025-05-27 | End: 2025-05-27 | Stop reason: HOSPADM

## 2025-05-27 RX ORDER — DIPHENHYDRAMINE HYDROCHLORIDE 50 MG/ML
25 INJECTION, SOLUTION INTRAMUSCULAR; INTRAVENOUS EVERY 4 HOURS PRN
Status: DISCONTINUED | OUTPATIENT
Start: 2025-05-27 | End: 2025-05-27 | Stop reason: HOSPADM

## 2025-05-27 RX ORDER — DIPHENHYDRAMINE HYDROCHLORIDE 50 MG/ML
12.5 INJECTION, SOLUTION INTRAMUSCULAR; INTRAVENOUS
Status: ACTIVE | OUTPATIENT
Start: 2025-05-27 | End: 2025-05-27

## 2025-05-27 RX ORDER — ROCURONIUM BROMIDE 10 MG/ML
INJECTION, SOLUTION INTRAVENOUS PRN
Status: DISCONTINUED | OUTPATIENT
Start: 2025-05-27 | End: 2025-05-27

## 2025-05-27 RX ORDER — POTASSIUM CHLORIDE 1500 MG/1
40 TABLET, EXTENDED RELEASE ORAL ONCE
Status: COMPLETED | OUTPATIENT
Start: 2025-05-27 | End: 2025-05-27

## 2025-05-27 RX ORDER — ONDANSETRON 2 MG/ML
4 INJECTION INTRAMUSCULAR; INTRAVENOUS 2 TIMES DAILY PRN
Status: DISCONTINUED | OUTPATIENT
Start: 2025-05-27 | End: 2025-05-27 | Stop reason: HOSPADM

## 2025-05-27 RX ORDER — ASPIRIN 81 MG/1
81 TABLET ORAL DAILY
Status: DISCONTINUED | OUTPATIENT
Start: 2025-05-28 | End: 2025-05-29 | Stop reason: HOSPADM

## 2025-05-27 RX ORDER — ONDANSETRON 2 MG/ML
4 INJECTION INTRAMUSCULAR; INTRAVENOUS
Status: ACTIVE | OUTPATIENT
Start: 2025-05-27 | End: 2025-05-27

## 2025-05-27 RX ORDER — SODIUM CHLORIDE 9 MG/ML
INJECTION, SOLUTION INTRAVENOUS CONTINUOUS
Status: DISCONTINUED | OUTPATIENT
Start: 2025-05-27 | End: 2025-05-28

## 2025-05-27 RX ORDER — HYDROCODONE BITARTRATE AND ACETAMINOPHEN 5; 325 MG/1; MG/1
1 TABLET ORAL
Refills: 0 | Status: DISCONTINUED | OUTPATIENT
Start: 2025-05-27 | End: 2025-05-27 | Stop reason: HOSPADM

## 2025-05-27 RX ORDER — HYDRALAZINE HYDROCHLORIDE 20 MG/ML
5 INJECTION INTRAMUSCULAR; INTRAVENOUS EVERY 10 MIN PRN
Status: DISCONTINUED | OUTPATIENT
Start: 2025-05-27 | End: 2025-05-27 | Stop reason: HOSPADM

## 2025-05-27 RX ORDER — HYDRALAZINE HYDROCHLORIDE 20 MG/ML
10 INJECTION INTRAMUSCULAR; INTRAVENOUS EVERY 4 HOURS PRN
Status: DISCONTINUED | OUTPATIENT
Start: 2025-05-27 | End: 2025-05-27 | Stop reason: HOSPADM

## 2025-05-27 RX ORDER — PROPOFOL 10 MG/ML
INJECTION, EMULSION INTRAVENOUS PRN
Status: DISCONTINUED | OUTPATIENT
Start: 2025-05-27 | End: 2025-05-27

## 2025-05-27 RX ORDER — PROTAMINE SULFATE 10 MG/ML
INJECTION, SOLUTION INTRAVENOUS PRN
Status: DISCONTINUED | OUTPATIENT
Start: 2025-05-27 | End: 2025-05-27

## 2025-05-27 RX ORDER — 0.9 % SODIUM CHLORIDE 0.9 %
2 VIAL (ML) INJECTION EVERY 12 HOURS SCHEDULED
Status: DISCONTINUED | OUTPATIENT
Start: 2025-05-27 | End: 2025-05-29 | Stop reason: HOSPADM

## 2025-05-27 RX ORDER — DROPERIDOL 2.5 MG/ML
0.62 INJECTION, SOLUTION INTRAMUSCULAR; INTRAVENOUS
Status: ACTIVE | OUTPATIENT
Start: 2025-05-27 | End: 2025-05-27

## 2025-05-27 RX ORDER — ONDANSETRON 2 MG/ML
INJECTION INTRAMUSCULAR; INTRAVENOUS PRN
Status: DISCONTINUED | OUTPATIENT
Start: 2025-05-27 | End: 2025-05-27

## 2025-05-27 RX ORDER — MEPERIDINE HYDROCHLORIDE 50 MG/ML
12.5 INJECTION INTRAMUSCULAR; INTRAVENOUS; SUBCUTANEOUS EVERY 10 MIN PRN
Status: DISCONTINUED | OUTPATIENT
Start: 2025-05-27 | End: 2025-05-27 | Stop reason: HOSPADM

## 2025-05-27 RX ORDER — DEXTROSE MONOHYDRATE 25 G/50ML
12.5 INJECTION, SOLUTION INTRAVENOUS PRN
Status: DISCONTINUED | OUTPATIENT
Start: 2025-05-27 | End: 2025-05-29 | Stop reason: HOSPADM

## 2025-05-27 RX ORDER — ACETAMINOPHEN 325 MG/1
650 TABLET ORAL EVERY 4 HOURS PRN
Status: DISCONTINUED | OUTPATIENT
Start: 2025-05-27 | End: 2025-05-29 | Stop reason: HOSPADM

## 2025-05-27 RX ORDER — CLOPIDOGREL BISULFATE 75 MG/1
75 TABLET ORAL DAILY
Status: DISCONTINUED | OUTPATIENT
Start: 2025-05-28 | End: 2025-05-29 | Stop reason: HOSPADM

## 2025-05-27 RX ORDER — LIDOCAINE HYDROCHLORIDE 20 MG/ML
INJECTION, SOLUTION INFILTRATION; PERINEURAL PRN
Status: DISCONTINUED | OUTPATIENT
Start: 2025-05-27 | End: 2025-05-27

## 2025-05-27 RX ADMIN — FENTANYL CITRATE 25 MCG: 50 INJECTION INTRAMUSCULAR; INTRAVENOUS at 16:58

## 2025-05-27 RX ADMIN — SODIUM CHLORIDE: 9 INJECTION, SOLUTION INTRAVENOUS at 12:36

## 2025-05-27 RX ADMIN — ONDANSETRON 4 MG: 2 INJECTION INTRAMUSCULAR; INTRAVENOUS at 15:36

## 2025-05-27 RX ADMIN — DEXAMETHASONE SODIUM PHOSPHATE 8 MG: 4 INJECTION INTRA-ARTICULAR; INTRALESIONAL; INTRAMUSCULAR; INTRAVENOUS; SOFT TISSUE at 14:39

## 2025-05-27 RX ADMIN — SODIUM CHLORIDE, POTASSIUM CHLORIDE, SODIUM LACTATE AND CALCIUM CHLORIDE: 600; 310; 30; 20 INJECTION, SOLUTION INTRAVENOUS at 14:39

## 2025-05-27 RX ADMIN — PROTAMINE SULFATE 50 MG: 10 INJECTION, SOLUTION INTRAVENOUS at 15:36

## 2025-05-27 RX ADMIN — ATORVASTATIN CALCIUM 40 MG: 40 TABLET, FILM COATED ORAL at 20:44

## 2025-05-27 RX ADMIN — GLYCOPYRROLATE 0.4 MG: 0.2 INJECTION INTRAMUSCULAR; INTRAVENOUS at 15:20

## 2025-05-27 RX ADMIN — SUGAMMADEX 200 MG: 100 INJECTION, SOLUTION INTRAVENOUS at 15:36

## 2025-05-27 RX ADMIN — ACETAMINOPHEN 650 MG: 325 TABLET ORAL at 20:44

## 2025-05-27 RX ADMIN — HEPARIN SODIUM 6000 UNITS: 1000 INJECTION, SOLUTION INTRAVENOUS; SUBCUTANEOUS at 15:10

## 2025-05-27 RX ADMIN — FENTANYL CITRATE 50 MCG: 50 INJECTION INTRAMUSCULAR; INTRAVENOUS at 15:02

## 2025-05-27 RX ADMIN — LIDOCAINE HYDROCHLORIDE 5 ML: 20 INJECTION, SOLUTION INFILTRATION; PERINEURAL at 14:37

## 2025-05-27 RX ADMIN — FENTANYL CITRATE 50 MCG: 50 INJECTION INTRAMUSCULAR; INTRAVENOUS at 14:37

## 2025-05-27 RX ADMIN — SODIUM CHLORIDE, PRESERVATIVE FREE 2 ML: 5 INJECTION INTRAVENOUS at 20:45

## 2025-05-27 RX ADMIN — PROPOFOL 150 MG: 10 INJECTION, EMULSION INTRAVENOUS at 14:39

## 2025-05-27 RX ADMIN — NOREPINEPHRINE BITARTRATE 2 MCG/MIN: 64 SOLUTION INTRAVENOUS at 18:58

## 2025-05-27 RX ADMIN — SODIUM CHLORIDE: 9 INJECTION, SOLUTION INTRAVENOUS at 20:45

## 2025-05-27 RX ADMIN — ROCURONIUM BROMIDE 50 MG: 10 INJECTION INTRAVENOUS at 14:39

## 2025-05-27 RX ADMIN — SODIUM CHLORIDE, POTASSIUM CHLORIDE, SODIUM LACTATE AND CALCIUM CHLORIDE 500 ML: 600; 310; 30; 20 INJECTION, SOLUTION INTRAVENOUS at 18:18

## 2025-05-27 RX ADMIN — POTASSIUM CHLORIDE 40 MEQ: 1500 TABLET, EXTENDED RELEASE ORAL at 19:13

## 2025-05-27 RX ADMIN — Medication 25 MCG/MIN: at 14:49

## 2025-05-27 SDOH — SOCIAL STABILITY: SOCIAL INSECURITY: HOW OFTEN DOES ANYONE, INCLUDING FAMILY AND FRIENDS, THREATEN YOU WITH HARM?: NEVER

## 2025-05-27 SDOH — HEALTH STABILITY: GENERAL: BECAUSE OF A PHYSICAL, MENTAL, OR EMOTIONAL CONDITION, DO YOU HAVE DIFFICULTY DOING ERRANDS ALONE?: NO

## 2025-05-27 SDOH — ECONOMIC STABILITY: INCOME INSECURITY: IN THE PAST 12 MONTHS, HAS THE ELECTRIC, GAS, OIL, OR WATER COMPANY THREATENED TO SHUT OFF SERVICE IN YOUR HOME?: NO

## 2025-05-27 SDOH — ECONOMIC STABILITY: GENERAL

## 2025-05-27 SDOH — HEALTH STABILITY: PHYSICAL HEALTH: DO YOU HAVE SERIOUS DIFFICULTY WALKING OR CLIMBING STAIRS?: NO

## 2025-05-27 SDOH — ECONOMIC STABILITY: HOUSING INSECURITY: WHAT IS YOUR LIVING SITUATION TODAY?: HOUSE

## 2025-05-27 SDOH — ECONOMIC STABILITY: HOUSING INSECURITY: DO YOU HAVE PROBLEMS WITH ANY OF THE FOLLOWING?: NONE OF THE ABOVE

## 2025-05-27 SDOH — ECONOMIC STABILITY: HOUSING INSECURITY: WHAT IS YOUR LIVING SITUATION TODAY?: I HAVE A STEADY PLACE TO LIVE

## 2025-05-27 SDOH — SOCIAL STABILITY: SOCIAL INSECURITY: HOW OFTEN DOES ANYONE, INCLUDING FAMILY AND FRIENDS, SCREAM OR CURSE AT YOU?: NEVER

## 2025-05-27 SDOH — ECONOMIC STABILITY: FOOD INSECURITY: WITHIN THE PAST 12 MONTHS, THE FOOD YOU BOUGHT JUST DIDN'T LAST AND YOU DIDN'T HAVE MONEY TO GET MORE.: NEVER TRUE

## 2025-05-27 SDOH — HEALTH STABILITY: GENERAL
BECAUSE OF A PHYSICAL, MENTAL, OR EMOTIONAL CONDITION, DO YOU HAVE SERIOUS DIFFICULTY CONCENTRATING, REMEMBERING OR MAKING DECISIONS?: NO

## 2025-05-27 SDOH — SOCIAL STABILITY: SOCIAL INSECURITY: HOW OFTEN DOES ANYONE, INCLUDING FAMILY AND FRIENDS, INSULT OR TALK DOWN TO YOU?: NEVER

## 2025-05-27 SDOH — ECONOMIC STABILITY: TRANSPORTATION INSECURITY
IN THE PAST 12 MONTHS, HAS LACK OF RELIABLE TRANSPORTATION KEPT YOU FROM MEDICAL APPOINTMENTS, MEETINGS, WORK OR FROM GETTING THINGS NEEDED FOR DAILY LIVING?: NO

## 2025-05-27 SDOH — HEALTH STABILITY: PHYSICAL HEALTH: DO YOU HAVE DIFFICULTY DRESSING OR BATHING?: NO

## 2025-05-27 SDOH — SOCIAL STABILITY: SOCIAL NETWORK
HOW OFTEN DO YOU SEE OR TALK TO PEOPLE THAT YOU CARE ABOUT AND FEEL CLOSE TO? (FOR EXAMPLE: TALKING TO FRIENDS ON THE PHONE, VISITING FRIENDS OR FAMILY, GOING TO CHURCH OR CLUB MEETINGS): 3 TO 5 TIMES A WEEK

## 2025-05-27 SDOH — ECONOMIC STABILITY: HOUSING INSECURITY: WHAT IS YOUR LIVING SITUATION TODAY?: SPOUSE;CHILDREN

## 2025-05-27 SDOH — SOCIAL STABILITY: SOCIAL NETWORK: SUPPORT SYSTEMS: CHILDREN;FAMILY MEMBERS;FRIENDS

## 2025-05-27 ASSESSMENT — LIFESTYLE VARIABLES
AUDIT-C TOTAL SCORE: 0
ALCOHOL_USE_STATUS: NO OR LOW RISK WITH VALIDATED TOOL
HOW OFTEN DO YOU HAVE A DRINK CONTAINING ALCOHOL: NEVER
HOW MANY STANDARD DRINKS CONTAINING ALCOHOL DO YOU HAVE ON A TYPICAL DAY: 0,1 OR 2
HOW OFTEN DO YOU HAVE 6 OR MORE DRINKS ON ONE OCCASION: NEVER

## 2025-05-27 ASSESSMENT — ORIENTATION MEMORY CONCENTRATION TEST (OMCT)
OMCT SCORE: 0
OMCT INTERPRETATION: 0-6: NO SIGNIFICANT IMPAIRMENT
WHAT MONTH IS IT NOW: CORRECT
WHAT YEAR IS IT NOW (MUST BE EXACT): CORRECT
SAY THE MONTHS IN REVERSE ORDER STARTING WITH LAST MONTH: CORRECT
COUNT BACKWARDS FROM 20 TO 1: CORRECT
WHAT TIME IS IT (NO WATCH OR CLOCK): CORRECT
REPEAT THE NAME AND ADDRESS I ASKED YOU TO REMEMBER: CORRECT

## 2025-05-27 ASSESSMENT — PAIN DESCRIPTION - PAIN TYPE: TYPE: ACUTE PAIN

## 2025-05-27 ASSESSMENT — COLUMBIA-SUICIDE SEVERITY RATING SCALE - C-SSRS
2. HAVE YOU ACTUALLY HAD ANY THOUGHTS OF KILLING YOURSELF?: NO
6. HAVE YOU EVER DONE ANYTHING, STARTED TO DO ANYTHING, OR PREPARED TO DO ANYTHING TO END YOUR LIFE?: NO
IS THE PATIENT ABLE TO COMPLETE C-SSRS: YES
1. WITHIN THE PAST MONTH, HAVE YOU WISHED YOU WERE DEAD OR WISHED YOU COULD GO TO SLEEP AND NOT WAKE UP?: NO

## 2025-05-27 ASSESSMENT — PAIN SCALES - GENERAL
PAINLEVEL_OUTOF10: 3
PAINLEVEL_OUTOF10: 0
PAINLEVEL_OUTOF10: 2
PAINLEVEL_OUTOF10: 6
PAINLEVEL_OUTOF10: 3
PAINLEVEL_OUTOF10: 6
PAINLEVEL_OUTOF10: 0
PAINLEVEL_OUTOF10: 4
PAINLEVEL_OUTOF10: 3
PAINLEVEL_OUTOF10: 3
PAINLEVEL_OUTOF10: 0
PAINLEVEL_OUTOF10: 0
PAINLEVEL_OUTOF10: 3

## 2025-05-27 ASSESSMENT — PATIENT HEALTH QUESTIONNAIRE - PHQ9
2. FEELING DOWN, DEPRESSED OR HOPELESS: NOT AT ALL
1. LITTLE INTEREST OR PLEASURE IN DOING THINGS: NOT AT ALL
SUM OF ALL RESPONSES TO PHQ9 QUESTIONS 1 AND 2: 0
SUM OF ALL RESPONSES TO PHQ9 QUESTIONS 1 AND 2: 0
IS PATIENT ABLE TO COMPLETE PHQ2 OR PHQ9: YES
CLINICAL INTERPRETATION OF PHQ2 SCORE: NO FURTHER SCREENING NEEDED

## 2025-05-27 ASSESSMENT — ACTIVITIES OF DAILY LIVING (ADL)
ADL_SHORT_OF_BREATH: NO
ADL_SCORE: 12
ADL_BEFORE_ADMISSION: INDEPENDENT
RECENT_DECLINE_ADL: NO

## 2025-05-28 PROBLEM — J98.8 VIRAL RESPIRATORY ILLNESS: Status: RESOLVED | Noted: 2025-03-14 | Resolved: 2025-05-28

## 2025-05-28 PROBLEM — I65.22 STENOSIS OF LEFT CAROTID ARTERY: Status: RESOLVED | Noted: 2025-03-03 | Resolved: 2025-05-28

## 2025-05-28 PROBLEM — H61.22 IMPACTED CERUMEN OF LEFT EAR: Status: RESOLVED | Noted: 2024-12-14 | Resolved: 2025-05-28

## 2025-05-28 PROBLEM — I95.81 POSTOPERATIVE HYPOTENSION: Status: ACTIVE | Noted: 2025-05-28

## 2025-05-28 PROBLEM — B97.89 VIRAL RESPIRATORY ILLNESS: Status: RESOLVED | Noted: 2025-03-14 | Resolved: 2025-05-28

## 2025-05-28 LAB
ANION GAP SERPL CALC-SCNC: 7 MMOL/L (ref 7–19)
ATRIAL RATE (BPM): 63
BASOPHILS # BLD: 0 K/MCL (ref 0–0.3)
BASOPHILS # BLD: 0 K/MCL (ref 0–0.3)
BASOPHILS NFR BLD: 0 %
BASOPHILS NFR BLD: 0 %
BUN SERPL-MCNC: 15 MG/DL (ref 6–20)
BUN/CREAT SERPL: 32 (ref 7–25)
CALCIUM SERPL-MCNC: 8.6 MG/DL (ref 8.4–10.2)
CHLORIDE SERPL-SCNC: 112 MMOL/L (ref 97–110)
CO2 SERPL-SCNC: 24 MMOL/L (ref 21–32)
CREAT SERPL-MCNC: 0.47 MG/DL (ref 0.51–0.95)
DEPRECATED RDW RBC: 46.2 FL (ref 39–50)
DEPRECATED RDW RBC: 47.7 FL (ref 39–50)
EGFRCR SERPLBLD CKD-EPI 2021: >90 ML/MIN/{1.73_M2}
EOSINOPHIL # BLD: 0 K/MCL (ref 0–0.5)
EOSINOPHIL # BLD: 0 K/MCL (ref 0–0.5)
EOSINOPHIL NFR BLD: 0 %
EOSINOPHIL NFR BLD: 0 %
ERYTHROCYTE [DISTWIDTH] IN BLOOD: 15.9 % (ref 11–15)
ERYTHROCYTE [DISTWIDTH] IN BLOOD: 15.9 % (ref 11–15)
FASTING DURATION TIME PATIENT: ABNORMAL H
GLUCOSE BLDC GLUCOMTR-MCNC: 124 MG/DL (ref 70–99)
GLUCOSE BLDC GLUCOMTR-MCNC: 137 MG/DL (ref 70–99)
GLUCOSE BLDC GLUCOMTR-MCNC: 147 MG/DL (ref 70–99)
GLUCOSE BLDC GLUCOMTR-MCNC: 156 MG/DL (ref 70–99)
GLUCOSE SERPL-MCNC: 190 MG/DL (ref 70–99)
HCT VFR BLD CALC: 27.4 % (ref 36–46.5)
HCT VFR BLD CALC: 32.1 % (ref 36–46.5)
HCT VFR BLD CALC: 33.9 % (ref 36–46.5)
HCT VFR BLD CALC: 34.3 % (ref 36–46.5)
HGB BLD-MCNC: 10.1 G/DL (ref 12–15.5)
HGB BLD-MCNC: 10.8 G/DL (ref 12–15.5)
HGB BLD-MCNC: 10.9 G/DL (ref 12–15.5)
HGB BLD-MCNC: 8.7 G/DL (ref 12–15.5)
IMM GRANULOCYTES # BLD AUTO: 0.1 K/MCL (ref 0–0.2)
IMM GRANULOCYTES # BLD AUTO: 0.1 K/MCL (ref 0–0.2)
IMM GRANULOCYTES # BLD: 0 %
IMM GRANULOCYTES # BLD: 0 %
LYMPHOCYTES # BLD: 1.3 K/MCL (ref 1–4)
LYMPHOCYTES # BLD: 1.3 K/MCL (ref 1–4)
LYMPHOCYTES NFR BLD: 8 %
LYMPHOCYTES NFR BLD: 9 %
MAGNESIUM SERPL-MCNC: 2 MG/DL (ref 1.7–2.4)
MCH RBC QN AUTO: 25.6 PG (ref 26–34)
MCH RBC QN AUTO: 26 PG (ref 26–34)
MCHC RBC AUTO-ENTMCNC: 31.8 G/DL (ref 32–36.5)
MCHC RBC AUTO-ENTMCNC: 31.9 G/DL (ref 32–36.5)
MCV RBC AUTO: 80.3 FL (ref 78–100)
MCV RBC AUTO: 81.9 FL (ref 78–100)
MONOCYTES # BLD: 0.4 K/MCL (ref 0.3–0.9)
MONOCYTES # BLD: 0.6 K/MCL (ref 0.3–0.9)
MONOCYTES NFR BLD: 2 %
MONOCYTES NFR BLD: 4 %
NEUTROPHILS # BLD: 13.1 K/MCL (ref 1.8–7.7)
NEUTROPHILS # BLD: 13.4 K/MCL (ref 1.8–7.7)
NEUTROPHILS NFR BLD: 87 %
NEUTROPHILS NFR BLD: 90 %
NRBC BLD MANUAL-RTO: 0 /100 WBC
NRBC BLD MANUAL-RTO: 0 /100 WBC
P AXIS (DEGREES): 72
PHOSPHATE SERPL-MCNC: 2.3 MG/DL (ref 2.4–4.7)
PLATELET # BLD AUTO: 237 K/MCL (ref 140–450)
PLATELET # BLD AUTO: 240 K/MCL (ref 140–450)
POTASSIUM SERPL-SCNC: 4.3 MMOL/L (ref 3.4–5.1)
PR-INTERVAL (MSEC): 204
QRS-INTERVAL (MSEC): 92
QT-INTERVAL (MSEC): 460
QTC: 471
R AXIS (DEGREES): 44
RBC # BLD: 4.19 MIL/MCL (ref 4–5.2)
RBC # BLD: 4.22 MIL/MCL (ref 4–5.2)
REPORT TEXT: NORMAL
SODIUM SERPL-SCNC: 139 MMOL/L (ref 135–145)
T AXIS (DEGREES): 59
VENTRICULAR RATE EKG/MIN (BPM): 63
WBC # BLD: 15 K/MCL (ref 4.2–11)
WBC # BLD: 15.1 K/MCL (ref 4.2–11)

## 2025-05-28 PROCEDURE — 85025 COMPLETE CBC W/AUTO DIFF WBC: CPT | Performed by: SURGERY

## 2025-05-28 PROCEDURE — 10002801 HB RX 250 W/O HCPCS: Performed by: SURGERY

## 2025-05-28 PROCEDURE — 83735 ASSAY OF MAGNESIUM: CPT | Performed by: SURGERY

## 2025-05-28 PROCEDURE — 10002807 HB RX 258: Performed by: SURGERY

## 2025-05-28 PROCEDURE — 36415 COLL VENOUS BLD VENIPUNCTURE: CPT | Performed by: INTERNAL MEDICINE

## 2025-05-28 PROCEDURE — 96372 THER/PROPH/DIAG INJ SC/IM: CPT | Performed by: SURGERY

## 2025-05-28 PROCEDURE — 10004180 HB COUNTER-TRANSPORT

## 2025-05-28 PROCEDURE — 80048 BASIC METABOLIC PNL TOTAL CA: CPT | Performed by: SURGERY

## 2025-05-28 PROCEDURE — 99233 SBSQ HOSP IP/OBS HIGH 50: CPT | Performed by: HOSPITALIST

## 2025-05-28 PROCEDURE — 85014 HEMATOCRIT: CPT | Performed by: INTERNAL MEDICINE

## 2025-05-28 PROCEDURE — 10002803 HB RX 637: Performed by: INTERNAL MEDICINE

## 2025-05-28 PROCEDURE — 93010 ELECTROCARDIOGRAM REPORT: CPT | Performed by: INTERNAL MEDICINE

## 2025-05-28 PROCEDURE — 99222 1ST HOSP IP/OBS MODERATE 55: CPT | Performed by: SPECIALIST

## 2025-05-28 PROCEDURE — 99233 SBSQ HOSP IP/OBS HIGH 50: CPT | Performed by: INTERNAL MEDICINE

## 2025-05-28 PROCEDURE — 93005 ELECTROCARDIOGRAM TRACING: CPT

## 2025-05-28 PROCEDURE — 85025 COMPLETE CBC W/AUTO DIFF WBC: CPT

## 2025-05-28 PROCEDURE — 94660 CPAP INITIATION&MGMT: CPT

## 2025-05-28 PROCEDURE — 99024 POSTOP FOLLOW-UP VISIT: CPT | Performed by: SURGERY

## 2025-05-28 PROCEDURE — 84100 ASSAY OF PHOSPHORUS: CPT | Performed by: SURGERY

## 2025-05-28 PROCEDURE — 85014 HEMATOCRIT: CPT | Performed by: HOSPITALIST

## 2025-05-28 PROCEDURE — 10002803 HB RX 637: Performed by: SURGERY

## 2025-05-28 PROCEDURE — 10002800 HB RX 250 W HCPCS: Performed by: SURGERY

## 2025-05-28 PROCEDURE — 99291 CRITICAL CARE FIRST HOUR: CPT | Performed by: SURGERY

## 2025-05-28 PROCEDURE — 10004651 HB RX, NO CHARGE ITEM: Performed by: SURGERY

## 2025-05-28 PROCEDURE — 10006031 HB ROOM CHARGE TELEMETRY

## 2025-05-28 RX ADMIN — CLOPIDOGREL BISULFATE 75 MG: 75 TABLET, FILM COATED ORAL at 08:14

## 2025-05-28 RX ADMIN — ATORVASTATIN CALCIUM 40 MG: 40 TABLET, FILM COATED ORAL at 21:23

## 2025-05-28 RX ADMIN — ACETAMINOPHEN 650 MG: 325 TABLET ORAL at 22:51

## 2025-05-28 RX ADMIN — SODIUM CHLORIDE, PRESERVATIVE FREE 2 ML: 5 INJECTION INTRAVENOUS at 21:23

## 2025-05-28 RX ADMIN — ASPIRIN 81 MG: 81 TABLET, COATED ORAL at 08:14

## 2025-05-28 RX ADMIN — ACETAMINOPHEN 650 MG: 325 TABLET ORAL at 10:43

## 2025-05-28 RX ADMIN — SODIUM PHOSPHATE, MONOBASIC, MONOHYDRATE AND SODIUM PHOSPHATE, DIBASIC, ANHYDROUS 30 MMOL: 142; 276 INJECTION, SOLUTION INTRAVENOUS at 13:22

## 2025-05-28 RX ADMIN — SODIUM CHLORIDE, PRESERVATIVE FREE 2 ML: 5 INJECTION INTRAVENOUS at 08:14

## 2025-05-28 RX ADMIN — INSULIN LISPRO 2 UNITS: 100 INJECTION, SOLUTION INTRAVENOUS; SUBCUTANEOUS at 13:28

## 2025-05-28 RX ADMIN — SODIUM CHLORIDE 500 ML: 9 INJECTION, SOLUTION INTRAVENOUS at 08:52

## 2025-05-28 SDOH — SOCIAL STABILITY: SOCIAL INSECURITY: HOW OFTEN DOES ANYONE, INCLUDING FAMILY AND FRIENDS, THREATEN YOU WITH HARM?: NEVER

## 2025-05-28 SDOH — SOCIAL STABILITY: SOCIAL INSECURITY: HOW OFTEN DOES ANYONE, INCLUDING FAMILY AND FRIENDS, SCREAM OR CURSE AT YOU?: NEVER

## 2025-05-28 SDOH — SOCIAL STABILITY: SOCIAL INSECURITY: HOW OFTEN DOES ANYONE, INCLUDING FAMILY AND FRIENDS, PHYSICALLY HURT YOU?: NEVER

## 2025-05-28 SDOH — SOCIAL STABILITY: SOCIAL INSECURITY: HOW OFTEN DOES ANYONE, INCLUDING FAMILY AND FRIENDS, INSULT OR TALK DOWN TO YOU?: NEVER

## 2025-05-28 ASSESSMENT — PAIN SCALES - GENERAL
PAINLEVEL_OUTOF10: 8
PAINLEVEL_OUTOF10: 2
PAINLEVEL_OUTOF10: 5
PAINLEVEL_OUTOF10: 4

## 2025-05-28 ASSESSMENT — ENCOUNTER SYMPTOMS
BRUISES/BLEEDS EASILY: 0
BACK PAIN: 0
WEAKNESS: 0
ABDOMINAL PAIN: 0
CONFUSION: 0
FATIGUE: 0
SHORTNESS OF BREATH: 0
FEVER: 0

## 2025-05-28 ASSESSMENT — LIFESTYLE VARIABLES
AUDIT-C TOTAL SCORE: 0
ALCOHOL_USE_STATUS: NO OR LOW RISK WITH VALIDATED TOOL
HOW MANY STANDARD DRINKS CONTAINING ALCOHOL DO YOU HAVE ON A TYPICAL DAY: 0,1 OR 2
HOW OFTEN DO YOU HAVE 6 OR MORE DRINKS ON ONE OCCASION: NEVER
HOW OFTEN DO YOU HAVE A DRINK CONTAINING ALCOHOL: NEVER

## 2025-05-28 ASSESSMENT — ACTIVITIES OF DAILY LIVING (ADL)
ADL_SCORE: 12
ADL_SHORT_OF_BREATH: NO
ADL_BEFORE_ADMISSION: INDEPENDENT
RECENT_DECLINE_ADL: NO

## 2025-05-29 VITALS
WEIGHT: 132.5 LBS | HEIGHT: 62 IN | OXYGEN SATURATION: 98 % | RESPIRATION RATE: 16 BRPM | TEMPERATURE: 97.5 F | DIASTOLIC BLOOD PRESSURE: 69 MMHG | SYSTOLIC BLOOD PRESSURE: 118 MMHG | BODY MASS INDEX: 24.38 KG/M2 | HEART RATE: 64 BPM

## 2025-05-29 LAB
ANION GAP SERPL CALC-SCNC: 7 MMOL/L (ref 7–19)
BUN SERPL-MCNC: 17 MG/DL (ref 6–20)
BUN/CREAT SERPL: 32 (ref 7–25)
CALCIUM SERPL-MCNC: 8.2 MG/DL (ref 8.4–10.2)
CHLORIDE SERPL-SCNC: 113 MMOL/L (ref 97–110)
CO2 SERPL-SCNC: 25 MMOL/L (ref 21–32)
CREAT SERPL-MCNC: 0.53 MG/DL (ref 0.51–0.95)
DEPRECATED RDW RBC: 49.4 FL (ref 39–50)
EGFRCR SERPLBLD CKD-EPI 2021: >90 ML/MIN/{1.73_M2}
ERYTHROCYTE [DISTWIDTH] IN BLOOD: 16.6 % (ref 11–15)
FASTING DURATION TIME PATIENT: ABNORMAL H
GLUCOSE BLDC GLUCOMTR-MCNC: 102 MG/DL (ref 70–99)
GLUCOSE BLDC GLUCOMTR-MCNC: 120 MG/DL (ref 70–99)
GLUCOSE SERPL-MCNC: 142 MG/DL (ref 70–99)
HCT VFR BLD CALC: 31.4 % (ref 36–46.5)
HGB BLD-MCNC: 9.6 G/DL (ref 12–15.5)
MAGNESIUM SERPL-MCNC: 2.2 MG/DL (ref 1.7–2.4)
MCH RBC QN AUTO: 25 PG (ref 26–34)
MCHC RBC AUTO-ENTMCNC: 30.6 G/DL (ref 32–36.5)
MCV RBC AUTO: 81.8 FL (ref 78–100)
NRBC BLD MANUAL-RTO: 0 /100 WBC
PHOSPHATE SERPL-MCNC: 2.6 MG/DL (ref 2.4–4.7)
PLATELET # BLD AUTO: 205 K/MCL (ref 140–450)
POTASSIUM SERPL-SCNC: 3.7 MMOL/L (ref 3.4–5.1)
RBC # BLD: 3.84 MIL/MCL (ref 4–5.2)
SODIUM SERPL-SCNC: 141 MMOL/L (ref 135–145)
WBC # BLD: 11.6 K/MCL (ref 4.2–11)

## 2025-05-29 PROCEDURE — 99024 POSTOP FOLLOW-UP VISIT: CPT | Performed by: SURGERY

## 2025-05-29 PROCEDURE — 99233 SBSQ HOSP IP/OBS HIGH 50: CPT | Performed by: SPECIALIST

## 2025-05-29 PROCEDURE — 10004651 HB RX, NO CHARGE ITEM: Performed by: SURGERY

## 2025-05-29 PROCEDURE — 83735 ASSAY OF MAGNESIUM: CPT | Performed by: SURGERY

## 2025-05-29 PROCEDURE — 10002803 HB RX 637: Performed by: SURGERY

## 2025-05-29 PROCEDURE — 99239 HOSP IP/OBS DSCHRG MGMT >30: CPT | Performed by: INTERNAL MEDICINE

## 2025-05-29 PROCEDURE — 85027 COMPLETE CBC AUTOMATED: CPT | Performed by: HOSPITALIST

## 2025-05-29 PROCEDURE — 80048 BASIC METABOLIC PNL TOTAL CA: CPT | Performed by: SURGERY

## 2025-05-29 PROCEDURE — 84100 ASSAY OF PHOSPHORUS: CPT | Performed by: SURGERY

## 2025-05-29 PROCEDURE — 36415 COLL VENOUS BLD VENIPUNCTURE: CPT | Performed by: SURGERY

## 2025-05-29 RX ADMIN — ACETAMINOPHEN 650 MG: 325 TABLET ORAL at 08:58

## 2025-05-29 RX ADMIN — SODIUM CHLORIDE, PRESERVATIVE FREE 2 ML: 5 INJECTION INTRAVENOUS at 08:58

## 2025-05-29 RX ADMIN — ASPIRIN 81 MG: 81 TABLET, COATED ORAL at 08:58

## 2025-05-29 RX ADMIN — CLOPIDOGREL BISULFATE 75 MG: 75 TABLET, FILM COATED ORAL at 08:58

## 2025-05-29 ASSESSMENT — PAIN SCALES - GENERAL
PAINLEVEL_OUTOF10: 3
PAINLEVEL_OUTOF10: 1

## 2025-05-31 ENCOUNTER — TELEPHONE (OUTPATIENT)
Dept: CARE COORDINATION | Age: 70
End: 2025-05-31

## 2025-05-31 PROBLEM — S13.4XXA SPRAIN OF LIGAMENTS OF CERVICAL SPINE: Status: ACTIVE | Noted: 2025-05-31

## 2025-05-31 PROBLEM — E11.9 NON-INSULIN DEPENDENT TYPE 2 DIABETES MELLITUS  (CMD): Status: RESOLVED | Noted: 2022-06-07 | Resolved: 2025-05-31

## 2025-06-02 ENCOUNTER — CASE MANAGEMENT (OUTPATIENT)
Dept: CARE COORDINATION | Age: 70
End: 2025-06-02

## 2025-06-07 ENCOUNTER — TELEPHONE (OUTPATIENT)
Dept: CARE COORDINATION | Age: 70
End: 2025-06-07

## 2025-06-09 ENCOUNTER — APPOINTMENT (OUTPATIENT)
Dept: CARDIOLOGY | Age: 70
End: 2025-06-09

## 2025-06-09 ENCOUNTER — CASE MANAGEMENT (OUTPATIENT)
Dept: CARE COORDINATION | Age: 70
End: 2025-06-09

## 2025-06-11 ENCOUNTER — OFFICE VISIT (OUTPATIENT)
Age: 70
End: 2025-06-11

## 2025-06-11 VITALS
OXYGEN SATURATION: 97 % | HEIGHT: 62 IN | SYSTOLIC BLOOD PRESSURE: 150 MMHG | WEIGHT: 129.08 LBS | BODY MASS INDEX: 23.75 KG/M2 | HEART RATE: 66 BPM | DIASTOLIC BLOOD PRESSURE: 76 MMHG

## 2025-06-11 DIAGNOSIS — I65.22 STENOSIS OF LEFT CAROTID ARTERY: Primary | ICD-10-CM

## 2025-06-11 PROCEDURE — 99024 POSTOP FOLLOW-UP VISIT: CPT | Performed by: SURGERY

## 2025-06-12 ENCOUNTER — TELEPHONE (OUTPATIENT)
Dept: CARDIOLOGY | Age: 70
End: 2025-06-12

## 2025-06-14 ENCOUNTER — TELEPHONE (OUTPATIENT)
Dept: CARE COORDINATION | Age: 70
End: 2025-06-14

## 2025-06-14 DIAGNOSIS — R93.89 ABNORMAL CT OF THE CHEST: Primary | ICD-10-CM

## 2025-06-16 ENCOUNTER — HOSPITAL ENCOUNTER (OUTPATIENT)
Dept: CT IMAGING | Age: 70
Discharge: HOME | End: 2025-06-16
Attending: INTERNAL MEDICINE

## 2025-06-16 DIAGNOSIS — R93.89 ABNORMAL CT OF THE CHEST: ICD-10-CM

## 2025-06-16 PROCEDURE — 10002805 HB CONTRAST AGENT: Performed by: INTERNAL MEDICINE

## 2025-06-16 PROCEDURE — 71260 CT THORAX DX C+: CPT

## 2025-06-16 RX ADMIN — IOHEXOL 80 ML: 350 INJECTION, SOLUTION INTRAVENOUS at 12:49

## 2025-06-21 ENCOUNTER — TELEPHONE (OUTPATIENT)
Dept: CARE COORDINATION | Age: 70
End: 2025-06-21

## 2025-06-28 ENCOUNTER — TELEPHONE (OUTPATIENT)
Dept: CARE COORDINATION | Age: 70
End: 2025-06-28

## 2025-07-07 DIAGNOSIS — R91.8 OTHER NONSPECIFIC ABNORMAL FINDING OF LUNG FIELD: Primary | ICD-10-CM

## 2025-07-25 ENCOUNTER — HOSPITAL ENCOUNTER (OUTPATIENT)
Dept: PET IMAGING | Age: 70
Discharge: HOME | End: 2025-07-25
Attending: INTERNAL MEDICINE

## 2025-07-25 DIAGNOSIS — R91.8 OTHER NONSPECIFIC ABNORMAL FINDING OF LUNG FIELD: ICD-10-CM

## 2025-07-25 PROCEDURE — A9552 F18 FDG: HCPCS

## 2025-07-25 PROCEDURE — 78815 PET IMAGE W/CT SKULL-THIGH: CPT

## 2025-07-25 PROCEDURE — 10006150 HB RX 343

## 2025-07-25 RX ADMIN — FLUDEOXYGLUCOSE F-18 9.09 MILLICURIE: 300 INJECTION INTRAVENOUS at 09:31

## 2025-08-15 PROBLEM — I95.81 POSTOPERATIVE HYPOTENSION: Status: RESOLVED | Noted: 2025-05-28 | Resolved: 2025-08-15

## 2025-08-15 PROBLEM — K59.04 CHRONIC IDIOPATHIC CONSTIPATION: Status: ACTIVE | Noted: 2025-08-15

## 2025-08-15 PROBLEM — R06.09 DYSPNEA ON EXERTION: Status: ACTIVE | Noted: 2025-08-15

## 2025-08-29 ENCOUNTER — APPOINTMENT (OUTPATIENT)
Dept: CARDIOLOGY | Age: 70
End: 2025-08-29

## 2025-08-29 ENCOUNTER — APPOINTMENT (OUTPATIENT)
Dept: CARDIOLOGY | Age: 70
End: 2025-08-29
Attending: SURGERY

## 2025-08-29 VITALS
HEIGHT: 62 IN | WEIGHT: 129.52 LBS | SYSTOLIC BLOOD PRESSURE: 123 MMHG | HEART RATE: 60 BPM | DIASTOLIC BLOOD PRESSURE: 72 MMHG | BODY MASS INDEX: 23.83 KG/M2 | OXYGEN SATURATION: 99 %

## 2025-08-29 DIAGNOSIS — Z95.5 STENTED CORONARY ARTERY: ICD-10-CM

## 2025-08-29 DIAGNOSIS — G47.33 OBSTRUCTIVE SLEEP APNEA: ICD-10-CM

## 2025-08-29 DIAGNOSIS — I10 ESSENTIAL HYPERTENSION, BENIGN: ICD-10-CM

## 2025-08-29 DIAGNOSIS — Z95.5 PRESENCE OF DRUG-ELUTING STENT IN ANTERIOR DESCENDING BRANCH OF LEFT CORONARY ARTERY: Primary | ICD-10-CM

## 2025-08-29 DIAGNOSIS — I65.22 STENOSIS OF LEFT CAROTID ARTERY: ICD-10-CM

## 2025-08-29 DIAGNOSIS — E78.5 DYSLIPIDEMIA, GOAL LDL BELOW 70: ICD-10-CM

## 2025-08-29 DIAGNOSIS — E11.42 DIABETIC POLYNEUROPATHY ASSOCIATED WITH TYPE 2 DIABETES MELLITUS  (CMD): ICD-10-CM

## 2025-08-29 SDOH — HEALTH STABILITY: MENTAL HEALTH: AUDIT TOTAL SCORE: 0

## 2025-08-29 SDOH — HEALTH STABILITY: MENTAL HEALTH: HOW OFTEN DO YOU HAVE 6 OR MORE DRINKS ON ONE OCCASION?: NEVER

## 2025-08-29 SDOH — HEALTH STABILITY: PHYSICAL HEALTH: ON AVERAGE, HOW MANY DAYS PER WEEK DO YOU ENGAGE IN MODERATE TO STRENUOUS EXERCISE (LIKE A BRISK WALK)?: 3 DAYS

## 2025-08-29 SDOH — HEALTH STABILITY: PHYSICAL HEALTH: ON AVERAGE, HOW MANY MINUTES DO YOU ENGAGE IN EXERCISE AT THIS LEVEL?: 40 MIN

## 2025-08-29 SDOH — HEALTH STABILITY: MENTAL HEALTH: HOW OFTEN DO YOU HAVE A DRINK CONTAINING ALCOHOL?: NEVER

## 2025-08-29 SDOH — HEALTH STABILITY: MENTAL HEALTH: HOW MANY STANDARD DRINKS CONTAINING ALCOHOL DO YOU HAVE ON A TYPICAL DAY?: 0,1 OR 2

## 2025-08-29 ASSESSMENT — PATIENT HEALTH QUESTIONNAIRE - PHQ9
1. LITTLE INTEREST OR PLEASURE IN DOING THINGS: NOT AT ALL
SUM OF ALL RESPONSES TO PHQ9 QUESTIONS 1 AND 2: 0
SUM OF ALL RESPONSES TO PHQ9 QUESTIONS 1 AND 2: 0
2. FEELING DOWN, DEPRESSED OR HOPELESS: NOT AT ALL

## 2025-09-08 ENCOUNTER — APPOINTMENT (OUTPATIENT)
Age: 70
End: 2025-09-08

## 2025-09-08 ENCOUNTER — APPOINTMENT (OUTPATIENT)
Dept: CARDIOLOGY | Age: 70
End: 2025-09-08
Attending: SURGERY

## 2025-09-16 ENCOUNTER — APPOINTMENT (OUTPATIENT)
Dept: PULMONOLOGY | Age: 70
End: 2025-09-16
Attending: INTERNAL MEDICINE

## 2025-09-29 ENCOUNTER — APPOINTMENT (OUTPATIENT)
Dept: CARDIOLOGY | Age: 70
End: 2025-09-29

## 2025-10-02 ENCOUNTER — APPOINTMENT (OUTPATIENT)
Dept: CARDIOLOGY | Age: 70
End: 2025-10-02

## (undated) DEVICE — KIT INTERVENTIONAL NEUROPROTECTION ENROUTE TRANSCAROTID SYSTEM PLUS

## (undated) DEVICE — GUIDEWIRE VASC OD.035 IN L150 CM EMERALD 3 MM RDS J CRV FX

## (undated) DEVICE — GYN CDS: Brand: MEDLINE INDUSTRIES, INC.

## (undated) DEVICE — CATHETER BLN ENROUTE ENFLATE 5MM 35MM 75CM RADOPQ RX LOWPRFL

## (undated) DEVICE — GLOVE SURG 7.5 PROTEXIS LF CRM PF SMTH BEAD CUFF STRL

## (undated) DEVICE — SUTURE COATED VICRYL PLUS 3-0 SH L27 IN BRAID ANBCTRL COATED

## (undated) DEVICE — SHEATH INTRO 6F 10 CM .035 IN SNAP ON DIL LOCK KINK RST SMTH

## (undated) DEVICE — GOWN SURG LG FABRIC ASTOUND BLUE LVL 3 NONREINFORCE SET IN

## (undated) DEVICE — GLOVE SURG 8 PROTEXIS LF CRM PF BEAD CUFF STRL PLISPRN 12IN

## (undated) DEVICE — SUTURE PROLENE 5-0 C-1 18IN 2 ARM MONO NABSB BLUE 8717H

## (undated) DEVICE — TOWEL L26 IN X W15 IN TIBURON NONABSORBENT DRAPE ADH STRIP

## (undated) DEVICE — COVER INST SUTBT ADH BACK CRTDG RADOPQ DISP YLW STRL LF

## (undated) DEVICE — HANDPIECE SCT MDVC FLX-CLR HI CAPACITY FLXB CLR STRL LF DISP

## (undated) DEVICE — SUTURE VCL+ MTPS 4-0 PS2 27IN BRAID COAT ABS

## (undated) DEVICE — Device

## (undated) DEVICE — BLADE SURG 10 INDIV FOIL WRAP STD SCPL HNDL STRL PRSNA + SS

## (undated) DEVICE — GUIDEWIRE ENROUTE 95CM .014IN VASC ERG CNTRL

## (undated) DEVICE — KENDALL SCD EXPRESS SLEEVES, KNEE LENGTH, MEDIUM: Brand: KENDALL SCD

## (undated) DEVICE — LOOP VSL MAXI 18IN RED LF 2 CARD MDCHC ELSTMR STRL

## (undated) DEVICE — SOL  .9 3000ML

## (undated) DEVICE — DRAPE SURG 2 INCISE FILM ANTIMICROBIAL L33 IN X W23 IN IOBAN

## (undated) DEVICE — KIT INTRO 4FR 50CM 15CM .018IN MICROWIRE XTN TUBE NDL STIFF

## (undated) DEVICE — DEVICE GTWY ENCORE ADV 20ML KIT TRQ GW INTRO INFL 20MM 3MM

## (undated) DEVICE — GOWN SURG XL SMARTGOWN LVL 4 RAGLAN SLV BRTHBL

## (undated) DEVICE — GLOVE SURG TRIUMPH SZ 6-1/2

## (undated) DEVICE — TUBING CYSTO

## (undated) DEVICE — BLADE SURG 11 UNFRM SHARPNESS STRL SS

## (undated) DEVICE — SYRINGE 30 ML CONC TIP GRAD NONPYROGENIC DEHP FREE LOK MED

## (undated) DEVICE — SUTURE SILK PERMA HAND BLK 2-0 PS L18 IN BRAID NABSB

## (undated) DEVICE — BLADE SURG 15 STRL PRSNA + PLMR

## (undated) DEVICE — NEEDLE SPINAL 22X3-1/2 BLK

## (undated) DEVICE — ADHESIVE SKIN CLSR DERMABOND ADVANCED .7 ML LIQUID APL

## (undated) NOTE — IP AVS SNAPSHOT
[facility] [place] [address] [facility], [address] ~ [phone]                Discharge Summary   5/24/2017    [facility]           Admission Information        Provider Department    5/24/2017 Elisa Wellington MD  Pre-Op / period. If you had an endometrial ablation, you can expect a watery discharge which may last for up to 4-6 weeks. You should avoid tampons for the first week after surgery.  Also no intercourse for one week after a D and C and two weeks after an ablation Do not take any Anti Inflammatory like Motrin, Aleve or Ibuprophen until after: 8:45 PM  · Please report any suspected allergic reactions or bleeding issues to your doctor    General Post Op Instructions:  1.  Do some nice big deep breathing-  ·  This can p coverage. Patient [address] is a Federal Navigator program that can help with your Affordable Care Act coverage, as well as all types of Medicaid plans.   To get signed up and covered, please call [phone] and ask to get set up for an insuran